# Patient Record
Sex: MALE | Race: WHITE | Employment: FULL TIME | URBAN - NONMETROPOLITAN AREA
[De-identification: names, ages, dates, MRNs, and addresses within clinical notes are randomized per-mention and may not be internally consistent; named-entity substitution may affect disease eponyms.]

---

## 2017-02-01 ENCOUNTER — OFFICE VISIT (OUTPATIENT)
Age: 62
End: 2017-02-01

## 2017-02-01 VITALS
RESPIRATION RATE: 16 BRPM | SYSTOLIC BLOOD PRESSURE: 122 MMHG | TEMPERATURE: 97.8 F | HEIGHT: 71 IN | WEIGHT: 206.6 LBS | HEART RATE: 72 BPM | BODY MASS INDEX: 28.92 KG/M2 | DIASTOLIC BLOOD PRESSURE: 70 MMHG

## 2017-02-01 DIAGNOSIS — T81.89XA ERODED SUTURE, INITIAL ENCOUNTER: Primary | ICD-10-CM

## 2017-02-01 PROCEDURE — 99213 OFFICE O/P EST LOW 20 MIN: CPT | Performed by: SURGERY

## 2017-02-01 ASSESSMENT — ENCOUNTER SYMPTOMS
VOICE CHANGE: 0
VOMITING: 0
DIARRHEA: 0
SHORTNESS OF BREATH: 0
CHOKING: 0
TROUBLE SWALLOWING: 0
FACIAL SWELLING: 0
ABDOMINAL DISTENTION: 0
EYE ITCHING: 0
ANAL BLEEDING: 0
EYE REDNESS: 0
RHINORRHEA: 0
CONSTIPATION: 0
COUGH: 0
BLOOD IN STOOL: 0
SINUS PRESSURE: 0
SORE THROAT: 0
COLOR CHANGE: 0
CHEST TIGHTNESS: 0
RECTAL PAIN: 0
EYE DISCHARGE: 0
ABDOMINAL PAIN: 1
EYE PAIN: 0
NAUSEA: 0
PHOTOPHOBIA: 0
WHEEZING: 0
APNEA: 0
BACK PAIN: 0
STRIDOR: 0

## 2020-02-03 ENCOUNTER — TELEPHONE (OUTPATIENT)
Dept: FAMILY MEDICINE CLINIC | Age: 65
End: 2020-02-03

## 2020-02-12 ENCOUNTER — OFFICE VISIT (OUTPATIENT)
Dept: FAMILY MEDICINE CLINIC | Age: 65
End: 2020-02-12
Payer: COMMERCIAL

## 2020-02-12 ENCOUNTER — NURSE ONLY (OUTPATIENT)
Dept: LAB | Age: 65
End: 2020-02-12

## 2020-02-12 VITALS
WEIGHT: 216 LBS | SYSTOLIC BLOOD PRESSURE: 124 MMHG | HEIGHT: 70 IN | TEMPERATURE: 98.7 F | DIASTOLIC BLOOD PRESSURE: 70 MMHG | HEART RATE: 78 BPM | BODY MASS INDEX: 30.92 KG/M2

## 2020-02-12 LAB
ALBUMIN SERPL-MCNC: 4.4 G/DL (ref 3.5–5.1)
ALP BLD-CCNC: 68 U/L (ref 38–126)
ALT SERPL-CCNC: 61 U/L (ref 11–66)
ANION GAP SERPL CALCULATED.3IONS-SCNC: 12 MEQ/L (ref 8–16)
AST SERPL-CCNC: 25 U/L (ref 5–40)
BASOPHILS # BLD: 0.7 %
BASOPHILS ABSOLUTE: 0 THOU/MM3 (ref 0–0.1)
BILIRUB SERPL-MCNC: 0.4 MG/DL (ref 0.3–1.2)
BUN BLDV-MCNC: 13 MG/DL (ref 7–22)
CALCIUM SERPL-MCNC: 9.6 MG/DL (ref 8.5–10.5)
CHLORIDE BLD-SCNC: 102 MEQ/L (ref 98–111)
CHOLESTEROL, TOTAL: 253 MG/DL (ref 100–199)
CO2: 25 MEQ/L (ref 23–33)
CREAT SERPL-MCNC: 0.9 MG/DL (ref 0.4–1.2)
EOSINOPHIL # BLD: 4.5 %
EOSINOPHILS ABSOLUTE: 0.3 THOU/MM3 (ref 0–0.4)
ERYTHROCYTE [DISTWIDTH] IN BLOOD BY AUTOMATED COUNT: 12.9 % (ref 11.5–14.5)
ERYTHROCYTE [DISTWIDTH] IN BLOOD BY AUTOMATED COUNT: 45.1 FL (ref 35–45)
GFR SERPL CREATININE-BSD FRML MDRD: 85 ML/MIN/1.73M2
GLUCOSE BLD-MCNC: 92 MG/DL (ref 70–108)
HCT VFR BLD CALC: 50.6 % (ref 42–52)
HDLC SERPL-MCNC: 38 MG/DL
HEMOGLOBIN: 16.7 GM/DL (ref 14–18)
IMMATURE GRANS (ABS): 0.05 THOU/MM3 (ref 0–0.07)
IMMATURE GRANULOCYTES: 0.7 %
LDL CHOLESTEROL CALCULATED: 184 MG/DL
LYMPHOCYTES # BLD: 28.5 %
LYMPHOCYTES ABSOLUTE: 2 THOU/MM3 (ref 1–4.8)
MCH RBC QN AUTO: 31.5 PG (ref 26–33)
MCHC RBC AUTO-ENTMCNC: 33 GM/DL (ref 32.2–35.5)
MCV RBC AUTO: 95.3 FL (ref 80–94)
MONOCYTES # BLD: 8.2 %
MONOCYTES ABSOLUTE: 0.6 THOU/MM3 (ref 0.4–1.3)
NUCLEATED RED BLOOD CELLS: 0 /100 WBC
PLATELET # BLD: 288 THOU/MM3 (ref 130–400)
PMV BLD AUTO: 10 FL (ref 9.4–12.4)
POTASSIUM SERPL-SCNC: 4.6 MEQ/L (ref 3.5–5.2)
PRO-BNP: 12.6 PG/ML (ref 0–900)
RBC # BLD: 5.31 MILL/MM3 (ref 4.7–6.1)
SEG NEUTROPHILS: 57.4 %
SEGMENTED NEUTROPHILS ABSOLUTE COUNT: 4 THOU/MM3 (ref 1.8–7.7)
SODIUM BLD-SCNC: 139 MEQ/L (ref 135–145)
TOTAL PROTEIN: 7.1 G/DL (ref 6.1–8)
TRIGL SERPL-MCNC: 153 MG/DL (ref 0–199)
TSH SERPL DL<=0.05 MIU/L-ACNC: 1.9 UIU/ML (ref 0.4–4.2)
WBC # BLD: 6.9 THOU/MM3 (ref 4.8–10.8)

## 2020-02-12 PROCEDURE — 99204 OFFICE O/P NEW MOD 45 MIN: CPT | Performed by: FAMILY MEDICINE

## 2020-02-12 PROCEDURE — 93000 ELECTROCARDIOGRAM COMPLETE: CPT | Performed by: FAMILY MEDICINE

## 2020-02-12 RX ORDER — PREDNISONE 20 MG/1
40 TABLET ORAL DAILY
Qty: 14 TABLET | Refills: 0 | Status: SHIPPED | OUTPATIENT
Start: 2020-02-12 | End: 2020-02-19

## 2020-02-12 RX ORDER — DICLOFENAC SODIUM 75 MG/1
75 TABLET, DELAYED RELEASE ORAL 2 TIMES DAILY
COMMUNITY
End: 2020-09-15 | Stop reason: ALTCHOICE

## 2020-02-12 RX ORDER — SILDENAFIL 50 MG/1
50 TABLET, FILM COATED ORAL PRN
Qty: 5 TABLET | Refills: 3 | Status: SHIPPED | OUTPATIENT
Start: 2020-02-12 | End: 2020-09-15 | Stop reason: ALTCHOICE

## 2020-02-12 RX ORDER — MINOCYCLINE HYDROCHLORIDE 100 MG/1
100 CAPSULE ORAL 2 TIMES DAILY
COMMUNITY
End: 2021-10-11

## 2020-02-12 ASSESSMENT — ENCOUNTER SYMPTOMS
BLOOD IN STOOL: 0
VOMITING: 0
EYE DISCHARGE: 0
DIARRHEA: 0
CONSTIPATION: 0
EYE REDNESS: 0
SHORTNESS OF BREATH: 0
SORE THROAT: 0
BACK PAIN: 0
NAUSEA: 0
COUGH: 0
WHEEZING: 0
EYE PAIN: 0

## 2020-02-12 NOTE — PROGRESS NOTES
erection  He has not spontaneous or nocturnal erections since symptoms started    He does have problems with sexual desire  He does have problems with low energy level  He does have symptoms of depression or anxiety, has noted that he is more emotional recently  He does not smoke   He does not drink alcohol regularly  He does not report difficulties with relationship with his partner(s)    He does not take oral nitrates for chest pain. No results found for: TESTOSTERONE    No results found for: PSA    EKG - Rhythm - sinus, Rate - 62, intervals wnl, no acute ST segment changes      I have reviewed the patient's past medical history, past surgical history, allergies,medications, social and family history and I have made updates where appropriate. Past Medical History:   Diagnosis Date    GERD (gastroesophageal reflux disease)        Past Surgical History:   Procedure Laterality Date    APPENDECTOMY  1983??    COLONOSCOPY  3-2016    CYSTOSCOPY Left 7/12/15    Left Ureteroscopy, Pyelogram, Left Ureteral Dilation, Stent Insertion - Dr. Murrieta [facility]  10/04/2016    Robotic Right Inguinal Hernia Repair, Umbilical Hernia Repair - Dr. Niko Murilloer    LITHOTRIPSY Left 3-18-16    LT ESWL    TONSILLECTOMY      as a child       Social History     Tobacco Use    Smoking status: Never Smoker    Smokeless tobacco: Never Used   Substance Use Topics    Alcohol use: Yes     Comment: social    Drug use: No       No family history on file. Review of Systems   Constitutional: Negative for chills and fever. HENT: Negative for congestion, ear pain, nosebleeds, sore throat and tinnitus. Eyes: Negative for pain, discharge and redness. Respiratory: Negative for cough, shortness of breath and wheezing. Cardiovascular: Positive for leg swelling. Negative for chest pain and palpitations. Gastrointestinal: Negative for blood in stool, constipation, diarrhea, nausea and vomiting.    Endocrine: Negative for polydipsia. Genitourinary: Negative for dysuria, frequency, hematuria and urgency. Musculoskeletal: Negative for back pain and myalgias. Skin: Negative for rash. Allergic/Immunologic: Negative for environmental allergies. Neurological: Positive for dizziness and light-headedness. Negative for tremors, seizures, weakness and headaches. Hematological: Does not bruise/bleed easily. Psychiatric/Behavioral: Negative for hallucinations and suicidal ideas. The patient is not nervous/anxious.             PHYSICAL EXAM:  /70   Pulse 78   Temp 98.7 °F (37.1 °C) (Oral)   Ht 5' 10\" (1.778 m)   Wt 216 lb (98 kg)   BMI 30.99 kg/m²     General Appearance: well developed and well- nourished, in no acute distress  Head: normocephalic and atraumatic  Eyes: pupils equal, round, and reactive to light,  conjunctivae and eye lids without erythema  ENT: external ear and ear canal normal bilaterally, TMs regular, nose without deformity, nasal mucosa and turbinates congested without polyps, oropharynx normal, dentition is normal for age, no lip or gum lesions noted  Neck: supple and non-tender without mass, no thyromegaly or thyroid nodules, no cervical lymphadenopathy  Pulmonary/Chest: clear to auscultation bilaterally- no wheezes, rales or rhonchi, normal air movement, no respiratory distress orretractions  Cardiovascular: normal rate, regular rhythm, normal S1 and S2, no murmurs, rubs, clicks, or gallops,distal pulses intact  Abdomen: soft, non-tender, non-distended, normal bowel sounds,  no rebound or guarding, nomasses or hernias noted, no hepatospleenomegaly  Extremities: no cyanosis, clubbing of the lower extremities, mild pedal edema noted bilaterally  Musculoskeletal: No joint swelling or gross deformity   Neuro:  Alert, 5/5 strength globally and symmetrically, normal speech, no focal findings or movement disorder noted, gait wnl  Psych:  Normal affect without evidence of depression or anxiety, insight and time. I have counseled him that taking Viagra with nitrates of any form can cause death. Return in about 4 weeks (around 3/11/2020), or if symptoms worsen or fail to improve. Hubert received counseling on the following healthy behaviors: medication adherence  Reviewedprior labs and health maintenance. Continue current medications, diet and exercise. Discussed use, benefit, and side effects of prescribed medications. Barriers to medication compliance addressed. Patient given educationalmaterials - see patient instructions. All patient questions answered. Patient voiced understanding.

## 2020-02-15 LAB — TESTOSTERONE TOTAL: 316 NG/DL (ref 300–720)

## 2020-03-12 ENCOUNTER — OFFICE VISIT (OUTPATIENT)
Dept: FAMILY MEDICINE CLINIC | Age: 65
End: 2020-03-12
Payer: COMMERCIAL

## 2020-03-12 ENCOUNTER — NURSE ONLY (OUTPATIENT)
Dept: LAB | Age: 65
End: 2020-03-12

## 2020-03-12 VITALS
DIASTOLIC BLOOD PRESSURE: 77 MMHG | HEIGHT: 71 IN | HEART RATE: 78 BPM | SYSTOLIC BLOOD PRESSURE: 117 MMHG | BODY MASS INDEX: 30.24 KG/M2 | WEIGHT: 216 LBS | RESPIRATION RATE: 14 BRPM | TEMPERATURE: 97.8 F

## 2020-03-12 LAB
CHOLESTEROL, TOTAL: 269 MG/DL (ref 100–199)
HDLC SERPL-MCNC: 34 MG/DL
LDL CHOLESTEROL CALCULATED: 198 MG/DL
TRIGL SERPL-MCNC: 187 MG/DL (ref 0–199)

## 2020-03-12 PROCEDURE — 99214 OFFICE O/P EST MOD 30 MIN: CPT | Performed by: FAMILY MEDICINE

## 2020-03-12 ASSESSMENT — PATIENT HEALTH QUESTIONNAIRE - PHQ9
SUM OF ALL RESPONSES TO PHQ QUESTIONS 1-9: 0
1. LITTLE INTEREST OR PLEASURE IN DOING THINGS: 0
2. FEELING DOWN, DEPRESSED OR HOPELESS: 0
SUM OF ALL RESPONSES TO PHQ9 QUESTIONS 1 & 2: 0
SUM OF ALL RESPONSES TO PHQ QUESTIONS 1-9: 0

## 2020-03-12 NOTE — PROGRESS NOTES
Vlad Melendez is a 60 y.o. male that presents for     Chief Complaint   Patient presents with    6 Month Follow-Up    Insomnia     trouble staying asleep (years) melatonin doesnt work well       /77   Pulse 78   Temp 97.8 °F (36.6 °C) (Oral)   Resp 14   Ht 5' 11\" (1.803 m)   Wt 216 lb (98 kg)   BMI 30.13 kg/m²       HPI:    Dizziness has improved. Has not had the symptoms for the past week. Has completed the prednisone at this point. Still feel like the left ear is 'blocked'. LE edema has been improved. Has been wearing the compression stockings more frequently. Insomnia:  Notes that he has not slept well for years. Goes to bed 10PM, 1/2 hour to fall asleep. Wakes up at 3:30 and has difficulty falling back asleep. Sleeps in a bed in a dark room. Drinks 1 cup coffee in the AM each day. No EtOH. Has tried melatonin without improvement. Dyslipidemia    Current Medication regimen - none  Personal History of CAD? No   Hx of CAD in first degree relatives? No  Current smoker? No  History of HTN? No  History of DM? No  10 year cardiac risk 12.2%    Shortness of breath or chest pain? No  Neurologic changes? No  Extremity edema?  No    Lab Results   Component Value Date    LDLCALC 184 02/12/2020     BP Readings from Last 3 Encounters:   03/12/20 117/77   02/12/20 124/70   06/24/17 126/86     Wt Readings from Last 3 Encounters:   03/12/20 216 lb (98 kg)   02/12/20 216 lb (98 kg)   06/24/17 198 lb (89.8 kg)         Health Maintenance   Topic Date Due    Hepatitis C screen  1955    HIV screen  04/30/1972    DTaP/Tdap/Td vaccine (1 - Tdap) 04/30/1976    Shingles Vaccine (1 of 2) 04/30/2007    Flu vaccine (1) 09/01/2019    Lipid screen  02/12/2025    Colon cancer screen colonoscopy  03/24/2026    Hepatitis A vaccine  Aged Out    Hepatitis B vaccine  Aged Out    Hib vaccine  Aged Out    Meningococcal (ACWY) vaccine  Aged Out    Pneumococcal 0-64 years Vaccine  Aged [facility] Past Medical History:   Diagnosis Date    GERD (gastroesophageal reflux disease)        Past Surgical History:   Procedure Laterality Date    APPENDECTOMY  1983??    COLONOSCOPY  3-2016    CYSTOSCOPY Left 7/12/15    Left Ureteroscopy, Pyelogram, Left Ureteral Dilation, Stent Insertion - Dr. Roldan Ngo  10/04/2016    Robotic Right Inguinal Hernia Repair, Umbilical Hernia Repair - Dr. Amy Shankar    LITHOTRIPSY Left 3-18-16    LT ESWL    TONSILLECTOMY      as a child       Social History     Tobacco Use    Smoking status: Never Smoker    Smokeless tobacco: Never Used   Substance Use Topics    Alcohol use: Yes     Comment: social    Drug use: No       No family history on file. I have reviewed the patient's past medical history, past surgical history, allergies, medications, social and family history and I have made updates where appropriate.     Review of Systems        PHYSICAL EXAM:  /77   Pulse 78   Temp 97.8 °F (36.6 °C) (Oral)   Resp 14   Ht 5' 11\" (1.803 m)   Wt 216 lb (98 kg)   BMI 30.13 kg/m²     General Appearance: well developed and well- nourished, in no acute distress  Head: normocephalic and atraumatic  ENT: external ear and ear canal normal bilaterally, nose without deformity, nasal mucosa and turbinates normal without polyps, oropharynx normal, dentition is normal for age, no lipor gum lesions noted  Neck: supple and non-tender without mass, no thyromegaly or thyroid nodules, no cervical lymphadenopathy  Pulmonary/Chest: clear to auscultation bilaterally- no wheezes, rales or rhonchi, normal air movement, no respiratory distress or retractions  Cardiovascular: normal rate, regular rhythm, normal S1 and S2, no murmurs, rubs, clicks, or gallops, distal pulses intact  Extremities: no cyanosis, clubbing or edema of the lower extremities  Psych:  Normal affect without evidence of depression oranxiety, insight and judgement are appropriate, memory appears intact  Skin: warm and dry, no rash or erythema      ASSESSMENT & PLAN  Carlyn Hatchet was seen today for 6 month follow-up and insomnia. Diagnoses and all orders for this visit:    Dyslipidemia  -     Lipid Panel; Future    Episodic lightheadedness    ETD (Eustachian tube dysfunction), bilateral    Insomnia, unspecified type    -Cardiac risk elevated, no personal or FamHx of CAD, repeat lipids in 6 months, if risk still elevated, will start treatment  -Declines treatment for insomnia at this time  -Other issues are doing better, will hold on further treatment or evaluation unless worsening. Return in about 6 months (around 9/12/2020), or if symptoms worsen or fail to improve. Controlled Substance Monitoring:    Acute and Chronic Pain Monitoring:   No flowsheet data found. Carlyn Hatchet received counseling on the following healthy behaviors: medication adherence  Reviewed prior labs and health maintenance. Continue current medications, diet and exercise. Discussed use, benefit, and side effects of prescribed medications. Barriers to medication compliance addressed. Patient given educational materials - see patient instructions. All patient questions answered. Patient voiced understanding.

## 2020-03-13 ENCOUNTER — TELEPHONE (OUTPATIENT)
Dept: FAMILY MEDICINE CLINIC | Age: 65
End: 2020-03-13

## 2020-03-13 RX ORDER — ATORVASTATIN CALCIUM 20 MG/1
20 TABLET, FILM COATED ORAL DAILY
Qty: 30 TABLET | Refills: 5 | Status: SHIPPED | OUTPATIENT
Start: 2020-03-13 | End: 2021-02-25

## 2020-09-15 ENCOUNTER — OFFICE VISIT (OUTPATIENT)
Dept: FAMILY MEDICINE CLINIC | Age: 65
End: 2020-09-15
Payer: COMMERCIAL

## 2020-09-15 VITALS
WEIGHT: 221.6 LBS | OXYGEN SATURATION: 99 % | TEMPERATURE: 98.2 F | BODY MASS INDEX: 31.73 KG/M2 | RESPIRATION RATE: 14 BRPM | SYSTOLIC BLOOD PRESSURE: 112 MMHG | DIASTOLIC BLOOD PRESSURE: 60 MMHG | HEIGHT: 70 IN | HEART RATE: 69 BPM

## 2020-09-15 PROCEDURE — 99214 OFFICE O/P EST MOD 30 MIN: CPT | Performed by: FAMILY MEDICINE

## 2020-09-15 RX ORDER — PREDNISONE 20 MG/1
40 TABLET ORAL DAILY
Qty: 14 TABLET | Refills: 0 | Status: SHIPPED | OUTPATIENT
Start: 2020-09-15 | End: 2020-09-22

## 2020-09-15 RX ORDER — OMEPRAZOLE 20 MG/1
20 CAPSULE, DELAYED RELEASE ORAL DAILY
COMMUNITY

## 2020-09-15 NOTE — PATIENT INSTRUCTIONS
Patient Education        Tennis Elbow: Care Instructions  Your Care Instructions     Tennis elbow is soreness or pain on the outer part of the elbow. The pain occurs when the tendon is stretched and becomes irritated by repeated twisting of the hand, wrist, and forearm. A tendon is a tough tissue that connects muscle to bone. This injury is common in tennis players. But you also can get it from many activities that work the same muscles. Examples include gardening, painting, and using tools. Tennis elbow usually heals with rest and treatment at home. Follow-up care is a key part of your treatment and safety. Be sure to make and go to all appointments, and call your doctor if you are having problems. It's also a good idea to know your test results and keep a list of the medicines you take. How can you care for yourself at home? · Rest your fingers, wrist, and forearm. Try to stop or reduce any activity that causes elbow pain. You may have to rest your arm for weeks to months. Follow your doctor's directions for how long to rest.  · Put ice or a cold pack on your elbow for 10 to 20 minutes at a time. Try to do this every 1 to 2 hours for the next 3 days (when you are awake) or until the swelling goes down. Put a thin cloth between the ice and your skin. You can try heat, or alternating heat and ice, after the first 3 days. · If your doctor gave you a brace or splint, use it as directed. A \"counterforce\" brace is a strap around your forearm, just below your elbow. It may ease the pressure on the tendon and spread force throughout your arm. · Prop up your elbow on pillows to help reduce swelling. · Follow your doctor's or physical therapist's directions for exercise. · Return to your usual activities slowly. · Try to prevent the problem. Learn the best techniques for your sport. For example, make sure the  on your tennis racquet is not too big for your hand.  Try not to hit a tennis ball late in your swing.  · Think about asking your employer about new ways of doing your job if your elbow pain is caused by something you do at work. Medicines  · Be safe with medicines. Read and follow all instructions on the label. ? If the doctor gave you a prescription medicine for pain, take it as prescribed. ? If you are not taking a prescription pain medicine, ask your doctor if you can take an over-the-counter medicine. When should you call for help? Call your doctor now or seek immediate medical care if:  · Your pain is worse. · You cannot bend your elbow normally. · Your arm or hand is cool or pale or changes color. · You have tingling, weakness, or numbness in your hand and fingers. Watch closely for changes in your health, and be sure to contact your doctor if:  · You have work problems caused by your elbow pain. · Your pain is not better after 2 weeks. Where can you learn more? Go to https://Venturocket.Folloyu. org and sign in to your [employer] account. Enter 0699 [id number] [phone] in the [employer]star box to learn more about \"Tennis Elbow: Care Instructions. \"     If you do not have an account, please click on the \"Sign Up Now\" link. Current as of: March 2, 2020               Content Version: 12.5  © 2006-2020 Healthwise, Incorporated. Care instructions adapted under license by [facility] ([facility]). If you have questions about a medical condition or this instruction, always ask your healthcare professional. [place] [address] any warranty or liability for your use of this information. Patient Education        Tennis Elbow: Exercises  Introduction  Here are some examples of exercises for you to try. The exercises may be suggested for a condition or for rehabilitation. Start each exercise slowly. Ease off the exercises if you start to have pain. You will be told when to start these exercises and which ones will work best for you. How to do the exercises  Wrist flexor stretch   1.  Extend your arm in front of you with your palm up. 2. Bend your wrist, pointing your hand toward the floor. 3. With your other hand, gently bend your wrist farther until you feel a mild to moderate stretch in your forearm. 4. Hold for at least 15 to 30 seconds. Repeat 2 to 4 times. Wrist extensor stretch   1. Repeat steps 1 to 4 of the stretch above but begin with your extended hand palm down. Ball or sock squeeze   1. Hold a tennis ball (or a rolled-up sock) in your hand. 2. Make a fist around the ball (or sock) and squeeze. 3. Hold for about 6 seconds, and then relax for up to 10 seconds. 4. Repeat 8 to 12 times. 5. Switch the ball (or sock) to your other hand and do 8 to 12 times. Wrist deviation   1. Sit so that your arm is supported but your hand hangs off the edge of a flat surface, such as a table. 2. Hold your hand out like you are shaking hands with someone. 3. Move your hand up and down. 4. Repeat this motion 8 to 12 times. 5. Switch arms. 6. Try to do this exercise twice with each hand. Wrist curls   1. Place your forearm on a table with your hand hanging over the edge of the table, palm up. 2. Place a 1- to 2-pound weight in your hand. This may be a dumbbell, a can of food, or a filled water bottle. 3. Slowly raise and lower the weight while keeping your forearm on the table and your palm facing up. 4. Repeat this motion 8 to 12 times. 5. Switch arms, and do steps 1 through 4.  6. Repeat with your hand facing down toward the floor. Switch arms. Biceps curls   1. Sit leaning forward with your legs slightly spread and your left hand on your left thigh. 2. Place your right elbow on your right thigh, and hold the weight with your forearm horizontal.  3. Slowly curl the weight up and toward your chest.  4. Repeat this motion 8 to 12 times. 5. Switch arms, and do steps 1 through 4. Follow-up care is a key part of your treatment and safety.  Be sure to make and go to all appointments, and call your doctor if you are having problems. It's also a good idea to know your test results and keep a list of the medicines you take. Where can you learn more? Go to https://FlockTAGpeiraThe African Store.Blued. org and sign in to your [employer] account. Enter N518 in the Pwinty box to learn more about \"Tennis Elbow: Exercises. \"     If you do not have an account, please click on the \"Sign Up Now\" link. Current as of: March 2, 2020               Content Version: 12.5  © 6417-0374 Healthwise, Incorporated. Care instructions adapted under license by [facility] ([facility]). If you have questions about a medical condition or this instruction, always ask your healthcare professional. [address] any warranty or liability for your use of this information.

## 2020-09-15 NOTE — PROGRESS NOTES
Baldemar Gupta is a 63 y.o. male that presents for     Chief Complaint   Patient presents with    6 Month Follow-Up    Elbow Pain     bilateral elbow pain past 2 weeks,   worse when lifting something        /60   Pulse 69   Temp 98.2 °F (36.8 °C) (Oral)   Resp 14   Ht 5' 10\" (1.778 m)   Wt 221 lb 9.6 oz (100.5 kg)   SpO2 99%   BMI 31.80 kg/m²       HPI:    Notes that he is having left elbow pain for the past 2 months. No inciting injury. Worse with extension and gripping. Insomnia:  Continues to have difficulty falling and staying asleep. Has been told that he does not feel rested through the day. Has been told that he is gasping. Dyslipidemia  Has not been able to obtain repeat lipids due to pandemic  Current Medication regimen - none currently  Tolerating medications well? Yes  Personal History of CAD? No   Hx of CAD in first degree relatives? No  Current smoker? No  History of HTN? No  History of DM? No    Shortness of breath or chest pain? No  Neurologic changes? No  Extremity edema?  No    Lab Results   Component Value Date    LDLCALC 198 03/12/2020     BP Readings from Last 3 Encounters:   09/15/20 112/60   03/12/20 117/77   02/12/20 124/70     Wt Readings from Last 3 Encounters:   09/15/20 221 lb 9.6 oz (100.5 kg)   03/12/20 216 lb (98 kg)   02/12/20 216 lb (98 kg)         Health Maintenance   Topic Date Due    Hepatitis C screen  1955    HIV screen  04/30/1972    DTaP/Tdap/Td vaccine (1 - Tdap) 04/30/1976    Shingles Vaccine (1 of 2) 04/30/2007    Flu vaccine (1) 09/01/2020    Lipid screen  03/12/2021    Colon cancer screen colonoscopy  03/24/2026    Hepatitis A vaccine  Aged Out    Hepatitis B vaccine  Aged Out    Hib vaccine  Aged Out    Meningococcal (ACWY) vaccine  Aged Out    Pneumococcal 0-64 years Vaccine  Aged Out       Past Medical History:   Diagnosis Date    GERD (gastroesophageal reflux disease)        Past Surgical History:   Procedure Laterality Date    APPENDECTOMY  1983??    COLONOSCOPY  3-2016    CYSTOSCOPY Left 7/12/15    Left Ureteroscopy, Pyelogram, Left Ureteral Dilation, Stent Insertion - Dr. Jerod Gillis  10/04/2016    Robotic Right Inguinal Hernia Repair, Umbilical Hernia Repair - Dr. Luke Hummel    LITHOTRIPSY Left 3-18-16    LT ESWL    TONSILLECTOMY      as a child       Social History     Tobacco Use    Smoking status: Never Smoker    Smokeless tobacco: Never Used   Substance Use Topics    Alcohol use: Yes     Comment: social    Drug use: No       History reviewed. No pertinent family history. I have reviewed the patient's past medical history, past surgical history, allergies, medications, social and family history and I have made updates where appropriate.     Review of Systems        PHYSICAL EXAM:  /60   Pulse 69   Temp 98.2 °F (36.8 °C) (Oral)   Resp 14   Ht 5' 10\" (1.778 m)   Wt 221 lb 9.6 oz (100.5 kg)   SpO2 99%   BMI 31.80 kg/m²     General Appearance: well developed and well- nourished, in no acute distress  Head: normocephalic and atraumatic  ENT: external ear and ear canal normal bilaterally, nose without deformity, nasal mucosa and turbinates normal without polyps, oropharynx normal, dentition is normal for age, no lipor gum lesions noted  Neck: supple and non-tender without mass, no thyromegaly or thyroid nodules, no cervical lymphadenopathy  Pulmonary/Chest: clear to auscultation bilaterally- no wheezes, rales or rhonchi, normal air movement, no respiratory distress or retractions  Cardiovascular: normal rate, regular rhythm, normal S1 and S2, no murmurs, rubs, clicks, or gallops, distal pulses intact  Abdomen: soft, non-tender, non-distended, no rebound or guarding, no masses or hernias noted, no hepatospleenomegaly  Extremities: no cyanosis, clubbing or edema of the lower extremities  Psych:  Normal affect without evidence of depression oranxiety, insight and judgement are

## 2020-09-17 ENCOUNTER — NURSE ONLY (OUTPATIENT)
Dept: LAB | Age: 65
End: 2020-09-17

## 2020-09-17 LAB
CHOLESTEROL, TOTAL: 216 MG/DL (ref 100–199)
HDLC SERPL-MCNC: 42 MG/DL
LDL CHOLESTEROL CALCULATED: 159 MG/DL
TRIGL SERPL-MCNC: 77 MG/DL (ref 0–199)

## 2020-11-03 ENCOUNTER — INITIAL CONSULT (OUTPATIENT)
Dept: PULMONOLOGY | Age: 65
End: 2020-11-03
Payer: COMMERCIAL

## 2020-11-03 VITALS
DIASTOLIC BLOOD PRESSURE: 70 MMHG | OXYGEN SATURATION: 99 % | HEIGHT: 70 IN | BODY MASS INDEX: 31.75 KG/M2 | SYSTOLIC BLOOD PRESSURE: 122 MMHG | WEIGHT: 221.8 LBS | HEART RATE: 68 BPM

## 2020-11-03 PROCEDURE — 99203 OFFICE O/P NEW LOW 30 MIN: CPT | Performed by: INTERNAL MEDICINE

## 2020-11-03 NOTE — PATIENT INSTRUCTIONS
Patient Education        Learning About CPAP for Sleep Apnea  What is CPAP? CPAP is a small machine that you use at home every night while you sleep. It increases air pressure in your throat to keep your airway open. When you have sleep apnea, this can help you sleep better so you feel much better. CPAP stands for \"continuous positive airway pressure. \"  The CPAP machine will have one of the following:  · A mask that covers your nose and mouth  · Prongs that fit into your nose  · A mask that covers your nose only, which is the most common type. This type is called NCPAP. The N stands for \"nasal.\"  Why is it done? CPAP is usually the best treatment for obstructive sleep apnea. It is the first treatment choice and the most widely used. CPAP:  · Helps you have more normal sleep, so you feel less sleepy and more alert during the daytime. · May help keep heart failure or other heart problems from getting worse. · May help lower your blood pressure. If you use CPAP, your bed partner may also sleep better. That's because you aren't snoring or restless. Your doctor may suggest CPAP if you have:  · Moderate to severe sleep apnea. · Sleep apnea and coronary artery disease (CAD). · Sleep apnea and heart failure. What are the side effects? Some people who use CPAP have:  · A dry or stuffy nose and a sore throat. · Irritated skin on the face. · Sore eyes. · Bloating. How can you care for yourself? If using CPAP is not comfortable, or if you have certain side effects, work with your doctor to fix them. Here are some things you can try:  · Be sure the mask or nasal prongs fit well. · See if your doctor can adjust the pressure of your CPAP. · If your nose is dry, try a humidifier. · If your nose is runny or stuffy, try decongestant medicine or a steroid nasal spray. Be safe with medicines. Read and follow all instructions on the label. Do not use the medicine longer than the label says.   If these things don't help, you might try a different type of machine. Some machines have air pressure that adjusts on its own. Others have air pressures that are different when you breathe in than when you breathe out. This may reduce discomfort caused by too much pressure in your nose. Where can you learn more? Go to https://chpepiceweb.[employer]. org and sign in to your [email or web address] account. Enter J503 in the [employer] box to learn more about \"Learning About CPAP for Sleep Apnea. \"     If you do not have an account, please click on the \"Sign Up Now\" link. Current as of: February 24, 2020               Content Version: 12.6  © 2006-2020 [employer]. Care instructions adapted under license by [employer]. If you have questions about a medical condition or this instruction, always ask your healthcare professional. [address]  any warranty or liability for your use of this information. Patient Education        Sleep Apnea: Care Instructions  Your Care Instructions     Sleep apnea means that you frequently stop breathing for 10 seconds or longer during sleep. It can be mild to severe, based on the number of times an hour that you stop breathing or have slowed breathing. Blocked or narrowed airways in your nose, mouth, or throat can cause sleep apnea. Your airway can become blocked when your throat muscles and tongue relax during sleep. You can treat sleep apnea at home by making lifestyle changes. You also can use a CPAP breathing machine that keeps tissues in the throat from blocking your airway. Or your doctor may suggest that you use a breathing device while you sleep. It helps keep your airway open. This could be a device that you put in your mouth. In some cases, surgery may be needed to remove enlarged tissues in the throat. Follow-up care is a key part of your treatment and safety.  Be sure to make and go to all appointments, and call your doctor if you are having problems. It's also a good idea to know your test results and keep a list of the medicines you take. How can you care for yourself at home? · Lose weight, if needed. It may reduce the number of times you stop breathing or have slowed breathing. · Sleep on your side. It may stop mild apnea. If you tend to roll onto your back, sew a pocket in the back of your pajama top. Put a tennis ball into the pocket, and stitch the pocket shut. This will help keep you from sleeping on your back. · Avoid alcohol and medicines such as sleeping pills and sedatives before bed. · Do not smoke. Smoking can make sleep apnea worse. If you need help quitting, talk to your doctor about stop-smoking programs and medicines. These can increase your chances of quitting for good. · Prop up the head of your bed 4 to 6 inches by putting bricks under the legs of the bed. · Treat breathing problems, such as a stuffy nose, caused by a cold or allergies. · Try a continuous positive airway pressure (CPAP) breathing machine if your doctor recommends it. The machine keeps your airway open when you sleep. · If CPAP does not work for you, ask your doctor if you can try other breathing machines. A bilevel positive airway pressure machine uses one type of air pressure for breathing in and another type for breathing out. Another device raises or lowers air pressure as needed while you breathe. · Talk to your doctor if:  ? Your nose feels dry or bleeds when you use one of these machines. You may need to increase moisture in the air. A humidifier may help. ? Your nose is runny or stuffy from using a breathing machine. Decongestants or a corticosteroid nasal spray may help. ? You are sleepy during the day and it gets in the way of the normal things you do. Do not drive when you are drowsy. When should you call for help?   Watch closely for changes in your health, and be sure to contact your doctor if:    · You still have sleep apnea even though you have made lifestyle changes.     · You are thinking of trying a device such as CPAP.     · You are having problems using a CPAP or similar machine. Where can you learn more? Go to https://[employer]ago. org and sign in to your [employer] account. Enter K204 in the [facility] box to learn more about \"Sleep Apnea: Care Instructions. \"     If you do not have an account, please click on the \"Sign Up Now\" link. Current as of: February 24, 2020               Content Version: 12.6  © 4917-3820 [employer]. Care instructions adapted under license by [facility] ([facility]). If you have questions about a medical condition or this instruction, always ask your healthcare professional. [address] any warranty or liability for your use of this information.

## 2020-11-03 NOTE — PROGRESS NOTES
New Sleep Patient H/P    Presentation:  Juan Luis Thorne is referred by BRENDA    Juan Luis Thorne has textbook s/s of BRENDA with loud snoring, non restorative sleep, daytime somnolence with periods of sleep at inappropriate times, witnessed episodes of sleep apnea by ex-wife, falling sleep driving. Has gained 30 lbs over the last few years, symptomatic for several years. Decreased production, lack of concentration, falls sleep at the computer at work. Time in Bed:   Bedtime: 11p.m. Awakens  7 a.m. Different on weekends? No       Hubert falls asleep in 5  minutes. Any awakenings? Yes  Difficulty Falling back to sleep? No  {Symptoms began:  several years ago. Symptoms include: snoring, choking, periods of not breathing, excessive daytime sleepiness, falling asleep while at work, driving, watching television, disrupted sleep, naps    Previous evaluation and treatment? No      He denies any history of sleep walking or sleep talking. No history of seizures activity. No history suggestive of restless legs syndrome. No history of bruxism. No history of head injury. Naps:  Any naps? Yes and are they helpful Yes    Snoring and Apneas:  Do you snore or been told you a snore? Yes  How long have known about your snoring? years  Any witnessed apneas? Yes  Any awakenings with choking or gasping? Yes    Dreams:  Any recurring dreams? No  Hallucinations? No  Sleep Paralysis? No  Symptoms of Cataplexy? No    Driving History:  Do you have a CDL or drive long distances for work? No  Any driving accidents in the past year? No  Any sleepiness while driving? Yes    Weight:  Any change in weight over the past year? Yes   How about past 5 years? Yes  How much? 30    Other Compliants :Juan Luis Thorne complains of decreased concentration as well.     Past Medical History:   Diagnosis Date    GERD (gastroesophageal reflux disease)        Past Surgical History:   Procedure Laterality Date    APPENDECTOMY  1983??    COLONOSCOPY  3-2016    CYSTOSCOPY Left 7/12/15    Left Ureteroscopy, Pyelogram, Left Ureteral Dilation, Stent Insertion - Dr. Jewel Boudreaux  10/04/2016    Robotic Right Inguinal Hernia Repair, Umbilical Hernia Repair - Dr. Merle Zamudio    LITHOTRIPSY Left 3-18-16    LT ESWL    TONSILLECTOMY      as a child       Social History     Tobacco Use    Smoking status: Never Smoker    Smokeless tobacco: Never Used   Substance Use Topics    Alcohol use: Yes     Comment: social    Drug use: No       No Known Allergies    Current Outpatient Medications   Medication Sig Dispense Refill    omeprazole (PRILOSEC) 20 MG delayed release capsule Take 20 mg by mouth daily      atorvastatin (LIPITOR) 20 MG tablet Take 1 tablet by mouth daily 30 tablet 5    minocycline (MINOCIN;DYNACIN) 100 MG capsule Take 100 mg by mouth 2 times daily      NONFORMULARY Urinary Tract Control      calcium carbonate (TUMS) 500 MG chewable tablet Take 1 tablet by mouth daily as needed for Heartburn       No current facility-administered medications for this visit. No family history on file. Any family history of any sleep problems or any one in your family on CPAP? Yes    Social History     Tobacco Use    Smoking status: Never Smoker    Smokeless tobacco: Never Used   Substance Use Topics    Alcohol use: Yes     Comment: social    Drug use: No     Caffeine Intake: How much soda (pop), coffee, tea, power drinks do you ingest per day? 4 per day. Employment History:  Where do you work? [employer]  What are your shifts? 7a to 4p from home        Review of Systems:   General/Constitutional: No recent loss of weight or appetite changes. No fever or chills. HENT: Negative. Eyes: Negative. Upper respiratory tract: No nasal stuffiness or post nasal drip. Lower respiratory tract/ lungs: No cough or sputum production. No hemoptysis. Cardiovascular: No palpitations or chest pain.   Gastrointestinal: No nausea or vomiting. Neurological: No focal neurologiacal weakness. Extremities: No edema. Musculoskeletal: No complaints. Genitourinary: No complaints. Hematological: Negative. Psychiatric/Behavioral: Negative. Skin: No itching. Physical Exam:    HEIGHTHeight: 5' 10\" (177.8 cm) WEIGHTWeight: 221 lb 12.8 oz (100.6 kg)    BMI:  Body mass index is 31.82 kg/m². Neck Size: 18.25 inches      ESS: 9   Vitals: /70 (Site: Left Upper Arm, Position: Sitting, Cuff Size: Large Adult)   Pulse 68   Ht 5' 10\" (1.778 m)   Wt 221 lb 12.8 oz (100.6 kg)   SpO2 99% Comment: on room air at rest  BMI 31.82 kg/m²       Mallampati Score: 3    Physical Exam :  Constitutional: BMI 31.8  HENT:   Head: Normocephalic and atraumatic. Mouth/Throat: Oropharynx is clear and moist. Large tongue, crowded pharynx, mallampati class 3 . Eyes: Conjunctivae are normal. PERRLA. No scleral icterus. Neck: Neck supple. No JVD present. No tracheal deviation present. Cardiovascular: Normal rate, regular rhythm, normal heart sounds. No murmur heard. Pulmonary/Chest: Effort normal and breath sounds normal. No stridor. No respiratory distress. No wheezes. No rales. Abdominal: Soft. No distension. No tenderness. Musculoskeletal: Normal range of motion. Lymphadenopathy:  No cervical adenopathy. Neurological: Alert and oriented to person, place, and time. No focal deficits. Skin: Skin is warm and dry. Patient is not diaphoretic. Psychiatric: Normal behavior with normal mood and affect. Diagnostic Data:    Assessment    Diagnosis Orders   1. Hypersomnia  Baseline Diagnostic Sleep Study   2. Obesity (BMI 30-39. 9)  Baseline Diagnostic Sleep Study   3. Witnessed episode of apnea  Baseline Diagnostic Sleep Study   4. Excessive sleepiness while driving  Baseline Diagnostic Sleep Study   5.  Sleep related choking sensation  Baseline Diagnostic Sleep Study         Plan   Orders Placed This Encounter   Procedures    Baseline Diagnostic Sleep Study     Standing Status:   Future     Standing Expiration Date:   11/3/2021     Order Specific Question:   Adult or Pediatric     Answer:   Adult Study (>7 Years)     Order Specific Question:   Location For Sleep Study     Answer:   [facility]     Order Specific Question:   Select Sleep Lab Location     Answer:   [address]           Mask Desensitization and Pre study teaching? No  Weight Loss Information Given? Yes  Sleep Hygiene Discussed? Yes  -He call my office for earlier appointment if needed for worsening of sleep symptoms.  -Millie Burton educated about my impression and plan. Patient verbalizes understanding.

## 2020-11-19 ENCOUNTER — TELEPHONE (OUTPATIENT)
Dept: SLEEP CENTER | Age: 65
End: 2020-11-19

## 2020-11-19 NOTE — TELEPHONE ENCOUNTER
Charlette Dowd does not meet criteria for in-lab testing. HST does not require authorization. Please advise. Thank you.    Jennifer Quan

## 2020-12-02 ENCOUNTER — HOSPITAL ENCOUNTER (OUTPATIENT)
Dept: SLEEP CENTER | Age: 65
Discharge: HOME | End: 2020-12-04
Payer: COMMERCIAL

## 2020-12-02 PROCEDURE — 95806 SLEEP STUDY UNATT&RESP EFFT: CPT

## 2020-12-02 NOTE — PROGRESS NOTES
Maria A Waterman presents today for a HST instruction and demonstration. Questions were asked and answers given. He was able to return demonstration and verbalized understanding. The sleep center control room phone number was provided incase questions arise during her study. Informed patient to call 911 in case of an emergency. He states he will return the unit tomorrow.    COVID screen done and negative

## 2020-12-03 LAB — STATUS: NORMAL

## 2020-12-14 NOTE — PROGRESS NOTES
[address] [place] [address]                               SLEEP STUDY REPORT    PATIENT NAME: Griffith Seip                      :        1955  MED REC NO:   [id number]                           ROOM:  ACCOUNT NO:   [de-identified]                           ADMIT DATE: 2020  PROVIDER:     YUNIOR Palencia Chana STUDY:  2020    HOME SLEEP STUDY REPORT    REFERRING PROVIDER:  Jacob Mckeon DO    HISTORY OF PRESENT ILLNESS:  The patient is a 82-year-old gentleman with  complaints of snoring, nonrestorative sleep, chronic fatigue. Weight  221 pounds, height 70 inches, BMI 31.7. METHODS:  The patient underwent Type III Portable Monitoring Sleep Study  including the simultaneous recording of oral-nasal airflow, rib and  abdominal respiratory effort, pulse rate, oxygen saturation, left and  right leg movement, and body position. Scoring criteria is consistent  with the current published AASM standards for scoring of apneas and  hypopneas 4A. DETAILS OF THE STUDY:  The patient came by the sleep lab and picked up  his HST unit #7073. He was given instructions how to set it up. The  unit was returned the next day. The information was successfully  downloaded and scored. Total recording time 442 minutes. Lights off  time 10:44 p.m., lights on time 06:07 a.m. RESPIRATORY SUMMARY:  55 obstructive apneas, 8 central apneas, 3 mixed  apneas, 55 obstructive hypopneas for an DENISE of 16.4.  81 of the events  occurred during supine time and 40 during non-supine time. PULSE OXIMETRY SUMMARY:  Mean oxygen saturation 94.9%, lowest oxygen  saturation 84%. There were 0.1 seconds of oxygen saturation below 88%. BODY POSITION SUMMARY:  230 minutes supine, 228 minutes non-supine, 12.6  minutes upright time. ECG SUMMARY:  The patient remained in normal sinus rhythm with PVCs. ASSESSMENT:  Obstructive sleep apnea with an DENISE of 16.4. The  obstructive index was 14.9 and the central index was 1.1. RECOMMENDATIONS:  APAP therapies, the patient will be contacted and will  be followed up in the sleep clinic to set up APAP. Follow up in the  sleep clinic eight weeks after initiating PAP therapies to review  download.         Elizabeth Cheney M.D.    D: 12/13/2020 17:37:09       T: 12/13/2020 21:32:03     [email or web address]  Job#: [id number]     Doc#: [id number]    CC:

## 2020-12-17 ENCOUNTER — OFFICE VISIT (OUTPATIENT)
Dept: PULMONOLOGY | Age: 65
End: 2020-12-17
Payer: COMMERCIAL

## 2020-12-17 VITALS
OXYGEN SATURATION: 98 % | HEART RATE: 80 BPM | BODY MASS INDEX: 32.64 KG/M2 | DIASTOLIC BLOOD PRESSURE: 78 MMHG | TEMPERATURE: 97 F | SYSTOLIC BLOOD PRESSURE: 122 MMHG | HEIGHT: 70 IN | WEIGHT: 228 LBS

## 2020-12-17 PROCEDURE — 99214 OFFICE O/P EST MOD 30 MIN: CPT | Performed by: NURSE PRACTITIONER

## 2020-12-17 NOTE — PATIENT INSTRUCTIONS
Patient Education        Sleep Apnea: Care Instructions  Your Care Instructions     Sleep apnea means that you frequently stop breathing for 10 seconds or longer during sleep. It can be mild to severe, based on the number of times an hour that you stop breathing or have slowed breathing. Blocked or narrowed airways in your nose, mouth, or throat can cause sleep apnea. Your airway can become blocked when your throat muscles and tongue relax during sleep. You can treat sleep apnea at home by making lifestyle changes. You also can use a CPAP breathing machine that keeps tissues in the throat from blocking your airway. Or your doctor may suggest that you use a breathing device while you sleep. It helps keep your airway open. This could be a device that you put in your mouth. In some cases, surgery may be needed to remove enlarged tissues in the throat. Follow-up care is a key part of your treatment and safety. Be sure to make and go to all appointments, and call your doctor if you are having problems. It's also a good idea to know your test results and keep a list of the medicines you take. How can you care for yourself at home? · Lose weight, if needed. It may reduce the number of times you stop breathing or have slowed breathing. · Sleep on your side. It may stop mild apnea. If you tend to roll onto your back, sew a pocket in the back of your pajama top. Put a tennis ball into the pocket, and stitch the pocket shut. This will help keep you from sleeping on your back. · Avoid alcohol and medicines such as sleeping pills and sedatives before bed. · Do not smoke. Smoking can make sleep apnea worse. If you need help quitting, talk to your doctor about stop-smoking programs and medicines. These can increase your chances of quitting for good. · Prop up the head of your bed 4 to 6 inches by putting bricks under the legs of the bed.   · Treat breathing problems, such as a stuffy nose, caused by a cold or allergies. · Try a continuous positive airway pressure (CPAP) breathing machine if your doctor recommends it. The machine keeps your airway open when you sleep. · If CPAP does not work for you, ask your doctor if you can try other breathing machines. A bilevel positive airway pressure machine uses one type of air pressure for breathing in and another type for breathing out. Another device raises or lowers air pressure as needed while you breathe. · Talk to your doctor if:  ? Your nose feels dry or bleeds when you use one of these machines. You may need to increase moisture in the air. A humidifier may help. ? Your nose is runny or stuffy from using a breathing machine. Decongestants or a corticosteroid nasal spray may help. ? You are sleepy during the day and it gets in the way of the normal things you do. Do not drive when you are drowsy. When should you call for help? Watch closely for changes in your health, and be sure to contact your doctor if:    · You still have sleep apnea even though you have made lifestyle changes.     · You are thinking of trying a device such as CPAP.     · You are having problems using a CPAP or similar machine. Where can you learn more? Go to https://[employer]onair.[employer]cer. org and sign in to your [employer] account. Enter V181 in the [employer] box to learn more about \"Sleep Apnea: Care Instructions. \"     If you do not have an account, please click on the \"Sign Up Now\" link. Current as of: February 24, 2020               Content Version: 12.6  © 2006-2020 [employer]. Care instructions adapted under license by [facility] ([facility]). If you have questions about a medical condition or this instruction, always ask your healthcare professional. [place] [address] any warranty or liability for your use of this information.

## 2020-12-17 NOTE — PROGRESS NOTES
[facility]    Jessica Perdue, 63 y.o.  [id number]      Pt of [facility]  Nurses Notes   Patient is here for home sleep study follow up. Study Results  Initial Study Date -  15. 2.20  AHI -  16.4    Total Events - 121  (Apneas  66  Hypopneas 55  Central  8)  (Total Recorded Time - 442.2 min)  Time with Sats below 88% - 0.1 min  Weight 221 lbs  Neck 18.25 inches  Mallampati III  ESS 9  SAQLI 43    Interval History       Jessica Perdue is a 63 y.o. old male who comes in to review the results of his recent sleep study, to answer questions and to explore options for treatment. Referred for snoring, witnessed apneas and significant EDS, falling asleep at work.     PMHx  Past Medical History:   Diagnosis Date    GERD (gastroesophageal reflux disease)     Other hyperlipidemia      Past Surgical History:   Procedure Laterality Date    APPENDECTOMY  1983??    COLONOSCOPY  3-2016    CYSTOSCOPY Left 7/12/15    Left Ureteroscopy, Pyelogram, Left Ureteral Dilation, Stent Insertion - Dr. Claudine Otero  10/04/2016    Robotic Right Inguinal Hernia Repair, Umbilical Hernia Repair - Dr. Janak Tolliver    LITHOTRIPSY Left 3-18-16    LT ESWL    TONSILLECTOMY      as a child     Social History     Tobacco Use    Smoking status: Never Smoker    Smokeless tobacco: Never Used   Substance Use Topics    Alcohol use: Yes     Comment: social    Drug use: No     Family History   Problem Relation Age of Onset    Sleep Apnea Father      Allergies  No Known Allergies  Meds  Current Outpatient Medications   Medication Sig Dispense Refill    omeprazole (PRILOSEC) 20 MG delayed release capsule Take 20 mg by mouth daily      atorvastatin (LIPITOR) 20 MG tablet Take 1 tablet by mouth daily 30 tablet 5    minocycline (MINOCIN;DYNACIN) 100 MG capsule Take 100 mg by mouth 2 times daily      NONFORMULARY Urinary Tract Control      calcium carbonate (TUMS) 500 MG chewable tablet Take 1 tablet by mouth daily as needed for Heartburn       No current facility-administered medications for this visit. ROS  Review of Systems  General/Constitutional: No recent loss of weight or appetite changes. No fever or chills. HENT: Negative. Eyes: Negative. Upper respiratory tract: No nasal stuffiness or post nasal drip. Lower respiratory tract/ lungs: No cough or sputum production. No hemoptysis. Cardiovascular: No palpitations or chest pain. Gastrointestinal: No nausea or vomiting. Neurological: No focal neurologiacal weakness. Extremities: No edema. Musculoskeletal: No complaints. Genitourinary: No complaints. Hematological: Negative. Psychiatric/Behavioral: Negative. Skin: No itching. Exam  Vitals -  /78 (Site: Left Upper Arm, Position: Sitting, Cuff Size: Medium Adult)   Pulse 80   Temp 97 °F (36.1 °C) (Temporal)   Ht 5' 10\" (1.778 m)   Wt 228 lb (103.4 kg)   SpO2 98% Comment: r/a  BMI 32.71 kg/m²    Body mass index is 32.71 kg/m². Oxygen level - 94.9  Physical Exam   General Appearance - Moderately built, moderately nourished, in no acute distress. HEENT - Head is normocephalic, atraumatic. PERRL. Oral mucosa pink and moist, no oral thrush. Mallampati Score - III (soft palate, base of uvula visible). Neck - Supple, symmetrical, trachea midline and soft. Lungs - Clear to auscultation, no wheezes, rales or rhonchi, aeration good. Cardiovascular - Heart sounds are normal. Regular rhythm normal rate without murmur, gallop or rub. Abdomen - Soft, nontender, non-distended. Neurologic - Alert and oriented x 3. Skin - No bruising or bleeding. Extremities - No cyanosis, clubbing or edema. Assessment   Diagnosis Orders   1. BRENDA (obstructive sleep apnea)  DME Order for CPAP as OP   2. Snoring     3. Hypersomnia     4. PVC (premature ventricular contraction)        Recommendations  I reviewed the home sleep study results in detail with Vlad Rocha.  We discussed various treatment options including positional therapy, OAT and PAP therapies along with the benefits and limitations of each. We also discussed the health risks with untreated BRENDA. Due to the severity of his symptoms and having PVC's with sleep, PAP therapies is recommended. He agrees to proceed with APAP set up with mask fitting. Follow up 8 weeks after PAP set up with download.     Electronically signed by OLGA Morocho CNP on 12/17/2020 at 10:52 AM

## 2021-02-25 ENCOUNTER — OFFICE VISIT (OUTPATIENT)
Dept: PULMONOLOGY | Age: 66
End: 2021-02-25
Payer: COMMERCIAL

## 2021-02-25 VITALS
HEIGHT: 70 IN | TEMPERATURE: 98.2 F | HEART RATE: 73 BPM | OXYGEN SATURATION: 97 % | BODY MASS INDEX: 33.5 KG/M2 | DIASTOLIC BLOOD PRESSURE: 70 MMHG | SYSTOLIC BLOOD PRESSURE: 118 MMHG | WEIGHT: 234 LBS

## 2021-02-25 DIAGNOSIS — Z99.89 OSA ON CPAP: Primary | ICD-10-CM

## 2021-02-25 DIAGNOSIS — G47.00 INSOMNIA, UNSPECIFIED TYPE: ICD-10-CM

## 2021-02-25 DIAGNOSIS — G47.33 OSA ON CPAP: Primary | ICD-10-CM

## 2021-02-25 DIAGNOSIS — M25.50 ARTHRALGIA, UNSPECIFIED JOINT: ICD-10-CM

## 2021-02-25 DIAGNOSIS — R51.9 NONINTRACTABLE HEADACHE, UNSPECIFIED CHRONICITY PATTERN, UNSPECIFIED HEADACHE TYPE: ICD-10-CM

## 2021-02-25 DIAGNOSIS — Z72.821 POOR SLEEP HYGIENE: ICD-10-CM

## 2021-02-25 PROCEDURE — 99214 OFFICE O/P EST MOD 30 MIN: CPT | Performed by: NURSE PRACTITIONER

## 2021-02-25 RX ORDER — GABAPENTIN 100 MG/1
CAPSULE ORAL
Qty: 90 CAPSULE | Refills: 2 | Status: SHIPPED | OUTPATIENT
Start: 2021-02-25 | End: 2021-03-12

## 2021-02-25 NOTE — PROGRESS NOTES
[place] [facility] [address]         [id number]  2/25/2021   Chief Complaint   Patient presents with    Follow-up     2 mo f/u auto adjust with download        Pt of Dr. Ruel AREVALO Download:   Eris Beal or initial AHI: 16.4   Date of initial study: 12/2/20  Weight of initial study: 221 lbs  [x] Compliant  100%   [] Noncompliant 0%     PAP Type Auto  Level  8/20   Avg Hrs/Day: 7 hr 56 min  AHI: 1.3  Leak: 2.6   Recorded compliance dates: 1/25/21-2/23/21   Machine/Mfg: Resmed Interface: FFM    Provider:  [x]-HME  []Matilde [][facility]  [][facility]         [][facility] []Other:     Neck Size: 18.25  Mallampati: Mallampati 3  ESS:  5 (down from 9)  SAQLI: 87 (up from, 43)    Here is a scan of the most recent download:            Presentation:   Lois Luu presents for sleep medicine follow up for obstructive sleep apnea. Since the last visit, Lois Luu started on APAP and is struggling with comfort, impacting his sleep. He has a sensitive head, can not even wear hats, and struggling with headgear. Has to pull tight due to leakage. Waking with headaches. Has always had issues staying asleep, making things worse. Currently taking Advil PM to aid with sleep/tolerance which does help but does not want to take consistently. Tried Melatonin, no benefit. Referred for snoring, witnessed apneas and significant EDS, falling asleep at work. Hx GERD. Equipment issues: The pressure is acceptable, the mask is somewhat acceptable and he is using the humidity. Sleep issues:  Do you feel better? No.  More rested? No.  Better concentration? NA. Progress History:   Since last visit any new medical issues? No.  New ER or hospitlal visits? No.  Any new or changes in medicines? No.  Any new sleep medicines?  No.      Past Medical History:   Diagnosis Date    GERD (gastroesophageal reflux disease)     BRENDA on CPAP     Other hyperlipidemia          Past Surgical History:   Procedure Laterality Date [address]    COLONOSCOPY  3-2016    CYSTOSCOPY Left 7/12/15    Left Ureteroscopy, Pyelogram, Left Ureteral Dilation, Stent Insertion - Dr. Shruti Zhang  10/04/2016    Robotic Right Inguinal Hernia Repair, Umbilical Hernia Repair - Dr. David Vela    LITHOTRIPSY Left 3-18-16    LT ESWL    TONSILLECTOMY      as a child         Social History     Tobacco Use    Smoking status: Never Smoker    Smokeless tobacco: Never Used   Substance Use Topics    Alcohol use: Yes     Comment: social    Drug use: No       Family History   Problem Relation Age of Onset    Sleep Apnea Father          No Known Allergies      Current Outpatient Medications   Medication Sig Dispense Refill    gabapentin (NEURONTIN) 100 MG capsule 100 mg for 3 nights, then if needed, 200 mg for 3 nights, then if needed 300 mg then call. 90 capsule 2    omeprazole (PRILOSEC) 20 MG delayed release capsule Take 20 mg by mouth daily      minocycline (MINOCIN;DYNACIN) 100 MG capsule Take 100 mg by mouth 2 times daily      NONFORMULARY Urinary Tract Control      calcium carbonate (TUMS) 500 MG chewable tablet Take 1 tablet by mouth daily as needed for Heartburn       No current facility-administered medications for this visit. Review of Systems   General/Constitutional: No recent loss of weight or appetite changes. No fever or chills. HENT: Negative. Eyes: Negative. Upper respiratory tract: No nasal stuffiness or post nasal drip. Lower respiratory tract/ lungs: No cough or sputum production. No hemoptysis. Cardiovascular: No palpitations or chest pain. Gastrointestinal: No nausea or vomiting. Neurological: No focal neurologiacal weakness. Extremities: No increased edema. Musculoskeletal: No new complaints. Genitourinary: No complaints. Hematological: Negative. Psychiatric/Behavioral: Negative. Skin: No itching. Physical Exam:    BMI: Body mass index is 33.58 kg/m².     Wt Readings from Last 3 Encounters:   02/25/21 234 lb (106.1 kg)   12/17/20 228 lb (103.4 kg)   11/03/20 221 lb 12.8 oz (100.6 kg)     Weight: gain of 13 lbs since study  Vitals: /70 (Site: Left Upper Arm, Position: Sitting, Cuff Size: Medium Adult)   Pulse 73   Temp 98.2 °F (36.8 °C)   Ht 5' 10\" (1.778 m)   Wt 234 lb (106.1 kg)   SpO2 97% Comment: on room air  BMI 33.58 kg/m²         General Appearance - Moderately built, moderately nourished, in no acute distress. HEENT - Head is normocephalic, atraumatic. PERRL. Oral mucosa pink and moist, no oral thrush. Mallampati Score - III (soft palate, base of uvula visible). Neck - Supple, symmetrical, trachea midline and soft. Lungs - Clear to auscultation, no wheezes, rales or rhonchi, aeration good. Cardiovascular - Heart sounds are normal. Regular rhythm normal rate without murmur, gallop or rub. Abdomen - Soft, nontender, non-distended. Neurologic - Alert and oriented x 3. Skin - No bruising or bleeding. Extremities - No cyanosis, clubbing or edema. ASSESSMENT/DIAGNOSIS     Diagnosis Orders   1. BRENDA on CPAP     2. Insomnia, unspecified type     3. Poor sleep hygiene     4. Nonintractable headache, unspecified chronicity pattern, unspecified headache type     5. Arthralgia, unspecified joint              Plan     Do you need any equipment today? No.  -Download reviewed. Due to issues, continue current settings.  -Discussed alternative options to treat BRENDA, such as oral appliance, but may not control snoring.  -Consider ENT referral.  -He would like to continue trying with CPAP. -Check with DME on alternate mask/headgear style or recommendations to aid with comfort.  -Discussed ways to improve leakage (possibly loosen headgear).   -Has facial hair.  -In regards to sleep issues, no benefit with Melatonin.  -Educated on sleep hygiene measures.  -Discussed option for sleep to aid with adjustment: Will try Neurontin for multiple purposes: sedating effects, has joint pain issues and will aid with headaches.  -Start 100 mg nightly, titrate as needed. Educated on indications, side effects and call with concerns. - He was advised to continue current positive airway pressure therapy with above described pressure. - He was advised to keep good compliance with current recommended pressure to get optimal results and clinical improvement.  - Educated on the health risks with untreated sleep apnea. - Recommend 7-9 hours of sleep with PAP treatment. - Re-educated on proper sleep hygiene. - He was advised to call [employer] company regarding supplies if needed. - He is to call my office for earlier appointment if needed for worsening of sleep symptoms.   - He was instructed on weight loss. Bret Turner was educated about my impression and plan and verbalizes understanding. We will see Michelle Mills back in: 3 months with download. 40 minutes was spent on this visit with > 50% being face to face obtaining HPI, examining patient, reviewing test results/download, educating and coordinating a treatment plan. Had many questions and concerns he requested to address.     Electronically signed by OLGA Fairchild CNP on 2/25/2021 at 1:56 PM

## 2021-02-25 NOTE — PATIENT INSTRUCTIONS
Patient Education        gabapentin  Pronunciation:  GA ba PEN tin  Brand:  Gralise, Horizant, Neurontin  What is the most important information I should know about gabapentin? Gabapentin can cause life-threatening breathing problems, especially in older adults or people with COPD. Seek emergency medical attention if you have very slow breathing. Some people have thoughts about suicide or behavior changes while taking gabapentin. Stay alert to changes in your mood or symptoms. Report any new or worsening symptoms to your doctor. Avoid driving or hazardous activity until you know how this medicine will affect you. Dizziness or drowsiness can cause falls, accidents, or severe injuries. Do not stop using gabapentin suddenly, even if you feel fine. What is gabapentin? Gabapentin is used together with other medicines to treat partial seizures in adults and children at least 1years old. Gabapentin is also used to treat nerve pain caused by herpes virus or shingles (herpes zoster) in adults. Use only the brand and form of gabapentin your doctor has prescribed. Check your medicine each time you get a refill to make sure you receive the correct form. Gralise is used only to treat nerve pain. Horizant is used to treat nerve pain and restless legs syndrome (RLS). Neurontin is used to treat nerve pain and seizures. Gabapentin may also be used for purposes not listed in this medication guide. What should I discuss with my healthcare provider before taking gabapentin? You should not use gabapentin if you are allergic to it.   Tell your doctor if you have ever had:  · lung disease, such as chronic obstructive pulmonary disease (COPD);  · kidney disease (or if you are on dialysis);  · diabetes;  · depression, a mood disorder, or suicidal thoughts or actions;  · a drug addiction;  · a seizure (unless you take gabapentin to treat seizures);  · liver disease;  · heart disease; or  · (for patients with RLS) if you are a day sleeper or work a night shift. Some people have thoughts about suicide while taking this medicine. Children taking gabapentin may have behavior changes. Stay alert to changes in your mood or symptoms. Report any new or worsening symptoms to your doctor. It is not known whether this medicine will harm an unborn baby. Tell your doctor if you are pregnant or plan to become pregnant. Seizure control is very important during pregnancy, and having a seizure could harm both mother and baby. Do not start or stop taking gabapentin for seizures without your doctor's advice, and tell your doctor right away if you become pregnant. It may not be safe to breastfeed while using this medicine. Ask your doctor about any risk. How should I take gabapentin? Follow all directions on your prescription label. Do not take this medicine in larger or smaller amounts or for longer than recommended. If your doctor changes your brand, strength, or type of gabapentin, your dosage needs may change. Ask your pharmacist if you have any questions about the new kind of gabapentin you receive at the pharmacy. The Horizant brand of gabapentin should not be taken during the day. For best results, take Horizant with food at about 5:00 in the evening. Both Gralise and Horizant should be taken with food. Neurontin can be taken with or without food. If you break a Neurontin tablet and take only half of it, take the other half at your next dose. Any tablet that has been broken should be used as soon as possible or within a few days. Swallow the capsule  or tablet  whole and do not crush, chew, break, or open it. Measure liquid medicine carefully. Use the dosing syringe provided, or use a medicine dose-measuring device (not a kitchen spoon). Do not stop using gabapentin suddenly, even if you feel fine. Stopping suddenly may cause increased seizures. Follow your doctor's instructions about tapering your dose.   In case of emergency, wear or carry medical identification to let others know you have seizures. This medicine can cause unusual results with certain medical tests. Tell any doctor who treats you that you are using gabapentin. Store gabapentin tablets and capsules at room temperature away from light and moisture. Store the liquid medicine in the refrigerator. Do not freeze. What happens if I miss a dose? Take the medicine as soon as you can, but skip the missed dose if it is almost time for your next dose. Do not take two doses at one time. If you take Horizant:  Skip the missed dose and use your next dose at the regular time. Do not use two doses of Horizant at one time. What happens if I overdose? Seek emergency medical attention or call the Poison Help line at 1-514.951.8506. What should I avoid while taking gabapentin? Avoid driving or hazardous activity until you know how this medicine will affect you. Your reactions could be impaired. Dizziness or drowsiness can cause falls, accidents, or severe injuries. Avoid taking an antacid within 2 hours before or after you take gabapentin. Antacids can make it harder for your body to absorb gabapentin. Avoid drinking alcohol while taking gabapentin. What are the possible side effects of gabapentin? Get emergency medical help if you have signs of an allergic reaction: hives; difficult breathing; swelling of your face, lips, tongue, or throat. Seek medical treatment if you have a serious drug reaction that can affect many parts of your body. Symptoms may include: skin rash, fever, swollen glands, muscle aches, severe weakness, unusual bruising, upper stomach pain, or yellowing of your skin or eyes.   Report any new or worsening symptoms to your doctor, such as: mood or behavior changes, anxiety, panic attacks, trouble sleeping, or if you feel impulsive, irritable, agitated, hostile, aggressive, restless, hyperactive (mentally or physically), depressed, or have thoughts about suicide or hurting yourself. Call your doctor at once if you have:  · weak or shallow breathing;  · blue-colored skin, lips, fingers, and toes;  · confusion, extreme drowsiness or weakness;  · problems with balance or muscle movement;  · unusual or involuntary eye movements; or  · increased seizures. Gabapentin can cause life-threatening breathing problems. A person caring for you should seek emergency medical attention if you have slow breathing with long pauses, blue colored lips, or if you are hard to wake up. Breathing problems may be more likely in older adults or in people with COPD. Some side effects are more likely in children taking gabapentin. Contact your doctor if the child taking this medicine has any of the following side effects:  · changes in behavior;  · memory problems;  · trouble concentrating; or  · acting restless, hostile, or aggressive. Common side effects may include:  · headache;  · dizziness, drowsiness, tiredness;  · problems with balance or eye movements; or  · (in children) fever, nausea, vomiting. This is not a complete list of side effects and others may occur. Call your doctor for medical advice about side effects. You may report side effects to FDA at 2-189-FDA-5979. What other drugs will affect gabapentin? Using gabapentin with other drugs that slow your breathing can cause dangerous side effects or death. Ask your doctor before using opioid medication, a sleeping pill, cold or allergy medicine, a muscle relaxer, or medicine for anxiety or seizures. Other drugs may affect gabapentin, including prescription and over-the-counter medicines, vitamins, and herbal products. Tell your doctor about all your current medicines and any medicine you start or stop using. Where can I get more information? Your pharmacist can provide more information about gabapentin.   Remember, keep this and all other medicines out of the reach of children, never share your medicines with others, and use this medication only for the indication prescribed. Every effort has been made to ensure that the information provided by [address] is accurate, up-to-date, and complete, but no guarantee is made to that effect. Drug information contained herein may be time sensitive. [facility] information has been compiled for use by healthcare practitioners and consumers in the [place] and therefore [facility] does not warrant that uses outside of the [place] are appropriate, unless specifically indicated otherwise. [facility]'s drug information does not endorse drugs, diagnose patients or recommend therapy. [facility]'s drug information is an informational resource designed to assist licensed healthcare practitioners in caring for their patients and/or to serve consumers viewing this service as a supplement to, and not a substitute for, the expertise, skill, knowledge and judgment of healthcare practitioners. The absence of a warning for a given drug or drug combination in no way should be construed to indicate that the drug or drug combination is safe, effective or appropriate for any given patient. [facility] does not assume any responsibility for any aspect of healthcare administered with the aid of information [facility] provides. The information contained herein is not intended to cover all possible uses, directions, precautions, warnings, drug interactions, allergic reactions, or adverse effects. If you have questions about the drugs you are taking, check with your doctor, nurse or pharmacist.  Copyright 1347-9752 [address]. Version: 16.01. Revision date: 4/30/2020. Care instructions adapted under license by [facility] ([facility]). If you have questions about a medical condition or this instruction, always ask your healthcare professional. [place] [address] any warranty or liability for your use of this information.

## 2021-03-09 ENCOUNTER — TELEPHONE (OUTPATIENT)
Dept: PULMONOLOGY | Age: 66
End: 2021-03-09

## 2021-03-09 NOTE — TELEPHONE ENCOUNTER
Pt called in stating the gabapentin is not working for him period. He stated he would rather take melatonin and a night time pain reliever like he was doing before. The pap is not working for him either. Do you have another suggestion? He is just not able to sleep at all. Can you take care of this since Cortes Teran is out please?

## 2021-03-12 RX ORDER — TRAZODONE HYDROCHLORIDE 50 MG/1
50 TABLET ORAL NIGHTLY
Qty: 30 TABLET | Refills: 0 | Status: SHIPPED | OUTPATIENT
Start: 2021-03-12 | End: 2021-04-02 | Stop reason: HOSPADM

## 2021-03-12 NOTE — TELEPHONE ENCOUNTER
Discontinue Neurontin medication. Start Trazodone nightly, new script sent to [facility], continue to try to use PAP with new medication .  Keep f/u as scheduled, call for sooner appt if continued issues

## 2021-03-17 ENCOUNTER — TELEPHONE (OUTPATIENT)
Dept: PULMONOLOGY | Age: 66
End: 2021-03-17

## 2021-03-17 NOTE — TELEPHONE ENCOUNTER
Patient returned pap AMA. Message left per Maryuri to offer patient an appointment to discuss or to see if he wanted to cancel future appointments.

## 2021-03-18 ENCOUNTER — OFFICE VISIT (OUTPATIENT)
Dept: FAMILY MEDICINE CLINIC | Age: 66
End: 2021-03-18
Payer: COMMERCIAL

## 2021-03-18 VITALS
BODY MASS INDEX: 33.41 KG/M2 | SYSTOLIC BLOOD PRESSURE: 132 MMHG | OXYGEN SATURATION: 98 % | HEIGHT: 70 IN | DIASTOLIC BLOOD PRESSURE: 86 MMHG | TEMPERATURE: 97.6 F | HEART RATE: 76 BPM | WEIGHT: 233.4 LBS | RESPIRATION RATE: 18 BRPM

## 2021-03-18 DIAGNOSIS — E78.5 DYSLIPIDEMIA: ICD-10-CM

## 2021-03-18 DIAGNOSIS — R10.12 LUQ PAIN: Primary | ICD-10-CM

## 2021-03-18 DIAGNOSIS — M26.629 TMJ SYNDROME: ICD-10-CM

## 2021-03-18 PROCEDURE — 99214 OFFICE O/P EST MOD 30 MIN: CPT | Performed by: FAMILY MEDICINE

## 2021-03-18 RX ORDER — TIZANIDINE 2 MG/1
2 TABLET ORAL EVERY 8 HOURS PRN
Qty: 90 TABLET | Refills: 0 | Status: SHIPPED | OUTPATIENT
Start: 2021-03-18 | End: 2021-10-11

## 2021-03-18 ASSESSMENT — PATIENT HEALTH QUESTIONNAIRE - PHQ9
SUM OF ALL RESPONSES TO PHQ QUESTIONS 1-9: 0
DEPRESSION UNABLE TO ASSESS: FUNCTIONAL CAPACITY MOTIVATION LIMITS ACCURACY
SUM OF ALL RESPONSES TO PHQ QUESTIONS 1-9: 0

## 2021-03-18 NOTE — PROGRESS NOTES
Liliane Bcaa is a 63 y.o. male that presents for     Chief Complaint   Patient presents with    6 Month Follow-Up     rt sided ear and jaw pain ongoing 2 weeks     Abdominal Pain     llq under ribcage  started 4 weeks ago       /86   Pulse 76   Temp 97.6 °F (36.4 °C)   Resp 18   Ht 5' 10\" (1.778 m)   Wt 233 lb 6.4 oz (105.9 kg)   SpO2 98%   BMI 33.49 kg/m²       HPI:    Notes right sided jaw/ear pain x several weeks. Abdominal Pain    HPI:       Symptoms have been present for 1 month(s). Location:  left upper quadrant    Description: sharp, persistent pain,    Provoking Factors - forward flexion, twisting  Alleviating Factors - trunk extension  Severity - moderate   Radiation: No    Change in pain with eating? No  Change in pain with BM? No  Nausea? no  Vomiting? no  Diarrhea? no  Constipation? no  Blood in Stools? yes  Dysuria/Change in Urinary Frequency/Hematuria? no  Back Pain? no    Review of Systems  Constitutional:   Negative for  chills, fever and changes in weight      States that he is not sleeping well still. He was dx'd with BRENDA but states that CPAP has not been helping with his sleep. Dyslipidemia    Current Medication regimen - none currently, would like to try to   Tolerating medications well? Yes  Personal History of CAD? No   Hx of CAD in first degree relatives? No  Current smoker? No  History of HTN? No  History of DM? No    Shortness of breath or chest pain? No  Neurologic changes? No  Extremity edema?  No    Lab Results   Component Value Date    LDLCALC 159 09/17/2020     BP Readings from Last 3 Encounters:   03/18/21 132/86   02/25/21 118/70   12/17/20 122/78     Wt Readings from Last 3 Encounters:   03/18/21 233 lb 6.4 oz (105.9 kg)   02/25/21 234 lb (106.1 kg)   12/17/20 228 lb (103.4 kg)         Health Maintenance   Topic Date Due    Hepatitis C screen  Never done    HIV screen  Never done    COVID-19 Vaccine (1) Never done    DTaP/Tdap/Td vaccine (1 - Tdap) Never done    Shingles Vaccine (1 of 2) Never done    Flu vaccine (1) Never done    Lipid screen  09/17/2025    Colon cancer screen colonoscopy  03/24/2026    Hepatitis A vaccine  Aged Out    Hepatitis B vaccine  Aged Out    Hib vaccine  Aged Out    Meningococcal (ACWY) vaccine  Aged Out    Pneumococcal 0-64 years Vaccine  Aged Out       Past Medical History:   Diagnosis Date    GERD (gastroesophageal reflux disease)     BRENDA on CPAP     Other hyperlipidemia        Past Surgical History:   Procedure Laterality Date    APPENDECTOMY  1983??    COLONOSCOPY  3-2016    CYSTOSCOPY Left 7/12/15    Left Ureteroscopy, Pyelogram, Left Ureteral Dilation, Stent Insertion - Dr. Ibrahim La Pryor  10/04/2016    Robotic Right Inguinal Hernia Repair, Umbilical Hernia Repair - Dr. Gonzalez Back    LITHOTRIPSY Left 3-18-16    LT ESWL    TONSILLECTOMY      as a child       Social History     Tobacco Use    Smoking status: Never Smoker    Smokeless tobacco: Never Used   Substance Use Topics    Alcohol use: Yes     Comment: social    Drug use: No       Family History   Problem Relation Age of Onset    Sleep Apnea Father          I have reviewed the patient's past medical history, past surgical history, allergies, medications, social and family history and I have made updates where appropriate.     Review of Systems        PHYSICAL EXAM:  /86   Pulse 76   Temp 97.6 °F (36.4 °C)   Resp 18   Ht 5' 10\" (1.778 m)   Wt 233 lb 6.4 oz (105.9 kg)   SpO2 98%   BMI 33.49 kg/m²     General Appearance: well developed and well- nourished, in no acute distress  Head: normocephalic and atraumatic  Extremities: no cyanosis, clubbing or edema of the lower extremities  Psych:  Normal affect without evidence of depression oranxiety, insight and judgement are appropriate, memory appears intact  Skin: warm and dry, no rash or erythema      ASSESSMENT & PLAN  Josh Rowell was seen today for 6 month follow-up and abdominal pain.    Diagnoses and all orders for this visit:    LUQ pain  -     tiZANidine (ZANAFLEX) 2 MG tablet; Take 1 tablet by mouth every 8 hours as needed (muscle pain)    TMJ syndrome    Dyslipidemia    -LUQ pain appears consistent with a muscular pain, will trial zanaflex and see if this helps with the symptoms  -Zanaflex may help with TMJ sx as well. Advised on conservative management as well.  -Other chronic issues are stable, continue current medications  -Advised to call if any issues        Return in about 6 months (around 9/18/2021), or if symptoms worsen or fail to improve. Controlled Substance Monitoring:    Acute and Chronic Pain Monitoring:   RX Monitoring 2/25/2021   Periodic Controlled Substance Monitoring No signs of potential drug abuse or diversion identified. ;Possible medication side effects, risk of tolerance/dependence & alternative treatments discussed. C/ Eras 47 received counseling on the following healthy behaviors: medication adherence  Reviewed prior labs and health maintenance. Continue current medications, diet and exercise. Discussed use, benefit, and side effects of prescribed medications. Barriers to medication compliance addressed. Patient given educational materials - see patient instructions. All patient questions answered. Patient voiced understanding.

## 2021-04-02 ENCOUNTER — OFFICE VISIT (OUTPATIENT)
Dept: PULMONOLOGY | Age: 66
End: 2021-04-02
Payer: COMMERCIAL

## 2021-04-02 VITALS
OXYGEN SATURATION: 98 % | HEIGHT: 70 IN | HEART RATE: 68 BPM | BODY MASS INDEX: 32.75 KG/M2 | TEMPERATURE: 97.7 F | WEIGHT: 228.8 LBS | DIASTOLIC BLOOD PRESSURE: 72 MMHG | SYSTOLIC BLOOD PRESSURE: 132 MMHG

## 2021-04-02 DIAGNOSIS — G47.00 INSOMNIA, UNSPECIFIED TYPE: ICD-10-CM

## 2021-04-02 DIAGNOSIS — Z78.9 CPAP VENTILATION TREATMENT NOT TOLERATED: ICD-10-CM

## 2021-04-02 DIAGNOSIS — R51.9 NONINTRACTABLE HEADACHE, UNSPECIFIED CHRONICITY PATTERN, UNSPECIFIED HEADACHE TYPE: Primary | ICD-10-CM

## 2021-04-02 DIAGNOSIS — G47.33 OSA (OBSTRUCTIVE SLEEP APNEA): ICD-10-CM

## 2021-04-02 PROCEDURE — 99214 OFFICE O/P EST MOD 30 MIN: CPT | Performed by: NURSE PRACTITIONER

## 2021-04-02 RX ORDER — DOXEPIN HYDROCHLORIDE 25 MG/1
25 CAPSULE ORAL NIGHTLY
Qty: 30 CAPSULE | Refills: 3 | Status: SHIPPED | OUTPATIENT
Start: 2021-04-02 | End: 2021-10-11

## 2021-04-02 NOTE — PROGRESS NOTES
[facility]      [facility]         [id number]  4/2/2021   Chief Complaint   Patient presents with    Follow-up     BRENDA 1 month sleep follow up. Patient returned pap AMA wants to discuss options        Pt of Dr. Elva Mckinney     PAP Download:   Original or initial AHI: 16.4    Date of initial study: 12/2/2020      Neck Size: 18.25  Mallampati Mallampati 3  ESS:  7  SAQLI: 55    Here is a scan of the most recent download:  None available, pt returned machine AMA    Presentation:   Jose Casiano presents for sleep medicine follow up for obstructive sleep apnea  Since the last visit, Jose Casiano , returned CPAP AMA, could not tolerate due to headaches. Can not stand anything around his head, including hats due headaches, was struglling with pressure and leaks. Continues to struggle with hypersomnia. Has tried multiple medications to help with insomnia including melatonin, Trazadone, OTC sleep aid with pain reliever, and gabapentin with no relief. Progress History:   Since last visit any new medical issues? No  New ER or hospitlal visits? No  Any new or changes in medicines? No  Any new sleep medicines? No    Review of Systems -   Review of Systems     Physical Exam:    BMI:  Body mass index is 32.83 kg/m². Wt Readings from Last 3 Encounters:   04/02/21 228 lb 12.8 oz (103.8 kg)   03/18/21 233 lb 6.4 oz (105.9 kg)   02/25/21 234 lb (106.1 kg)     Weight gained 12 lbs over 4 months  Vitals: /72 (Site: Left Upper Arm, Position: Sitting, Cuff Size: Medium Adult)   Pulse 68   Temp 97.7 °F (36.5 °C)   Ht 5' 10\" (1.778 m)   Wt 228 lb 12.8 oz (103.8 kg)   SpO2 98% Comment: on RA  BMI 32.83 kg/m²       Physical Exam      ASSESSMENT/DIAGNOSIS     Diagnosis Orders   1. Nonintractable headache, unspecified chronicity pattern, unspecified headache type  Ambulatory referral to Dentistry   2.  Insomnia, unspecified type  doxepin (SINEQUAN) 25 MG capsule    Ambulatory referral to Dentistry

## 2021-06-03 ENCOUNTER — NURSE ONLY (OUTPATIENT)
Dept: LAB | Age: 66
End: 2021-06-03

## 2021-09-20 ENCOUNTER — OFFICE VISIT (OUTPATIENT)
Dept: FAMILY MEDICINE CLINIC | Age: 66
End: 2021-09-20
Payer: COMMERCIAL

## 2021-09-20 VITALS
RESPIRATION RATE: 16 BRPM | WEIGHT: 233.6 LBS | OXYGEN SATURATION: 97 % | BODY MASS INDEX: 33.44 KG/M2 | HEART RATE: 75 BPM | HEIGHT: 70 IN | SYSTOLIC BLOOD PRESSURE: 128 MMHG | TEMPERATURE: 97.6 F | DIASTOLIC BLOOD PRESSURE: 84 MMHG

## 2021-09-20 DIAGNOSIS — E78.5 DYSLIPIDEMIA: ICD-10-CM

## 2021-09-20 DIAGNOSIS — R60.0 LOWER EXTREMITY EDEMA: Primary | ICD-10-CM

## 2021-09-20 PROCEDURE — 99214 OFFICE O/P EST MOD 30 MIN: CPT | Performed by: FAMILY MEDICINE

## 2021-09-20 RX ORDER — FUROSEMIDE 20 MG/1
20 TABLET ORAL DAILY
Qty: 30 TABLET | Refills: 5 | Status: SHIPPED | OUTPATIENT
Start: 2021-09-20 | End: 2022-09-29

## 2021-09-20 SDOH — ECONOMIC STABILITY: FOOD INSECURITY: WITHIN THE PAST 12 MONTHS, THE FOOD YOU BOUGHT JUST DIDN'T LAST AND YOU DIDN'T HAVE MONEY TO GET MORE.: NEVER TRUE

## 2021-09-20 SDOH — ECONOMIC STABILITY: FOOD INSECURITY: WITHIN THE PAST 12 MONTHS, YOU WORRIED THAT YOUR FOOD WOULD RUN OUT BEFORE YOU GOT MONEY TO BUY MORE.: NEVER TRUE

## 2021-09-20 ASSESSMENT — SOCIAL DETERMINANTS OF HEALTH (SDOH): HOW HARD IS IT FOR YOU TO PAY FOR THE VERY BASICS LIKE FOOD, HOUSING, MEDICAL CARE, AND HEATING?: NOT VERY HARD

## 2021-09-20 NOTE — PROGRESS NOTES
Gerry Ahn is a 61 y.o. male that presents for     Chief Complaint   Patient presents with    6 Month Follow-Up    Edema     legs and feet       /84   Pulse 75   Temp 97.6 °F (36.4 °C) (Infrared)   Resp 16   Ht 5' 10\" (1.778 m)   Wt 233 lb 9.6 oz (106 kg)   SpO2 97%   BMI 33.52 kg/m²       HPI    LE Edema    HPI:    Gerry Anh is complaining of swelling in both legs. Feels like symptoms are getting worse. Tends to worsen through the day. Notes that he is sitting most of the day. Symptoms have been present for 3 year(s). Inciting event? No   Weight change associated with swelling? No    Erythema? No  Tenderness? No  Shortness of breath or chest pain? No  Orthopnea? No     Hx of renal, thyroid or liver disease? No  Hx of CHF? No  Hx of thromboembolic diease? No  Medications associated with fluid retention? No      Dyslipidemia    Current Medication regimen - none  Personal History of CAD? No   Hx of CAD in first degree relatives? No  Current smoker? No  History of HTN? No  History of DM? No    Shortness of breath or chest pain? No  Neurologic changes? No  Extremity edema?  Yes    Lab Results   Component Value Date    LDLCALC 159 09/17/2020     BP Readings from Last 3 Encounters:   09/20/21 128/84   04/02/21 132/72   03/18/21 132/86     Wt Readings from Last 3 Encounters:   09/20/21 233 lb 9.6 oz (106 kg)   04/02/21 228 lb 12.8 oz (103.8 kg)   03/18/21 233 lb 6.4 oz (105.9 kg)         Health Maintenance   Topic Date Due    Hepatitis C screen  Never done    COVID-19 Vaccine (1) Never done    HIV screen  Never done    DTaP/Tdap/Td vaccine (1 - Tdap) Never done    Shingles Vaccine (1 of 2) Never done    Potassium monitoring  02/12/2021    Creatinine monitoring  02/12/2021    Flu vaccine (1) Never done    Lipid screen  09/17/2025    Colon cancer screen colonoscopy  06/03/2031    Hepatitis A vaccine  Aged Out    Hepatitis B vaccine  Aged Out    Hib vaccine  Aged Out  Meningococcal (ACWY) vaccine  Aged Out    Pneumococcal 0-64 years Vaccine  Aged Out       Past Medical History:   Diagnosis Date    GERD (gastroesophageal reflux disease)     BRENDA on CPAP     Other hyperlipidemia        Past Surgical History:   Procedure Laterality Date    APPENDECTOMY  1983??    COLONOSCOPY  3-2016    CYSTOSCOPY Left 7/12/15    Left Ureteroscopy, Pyelogram, Left Ureteral Dilation, Stent Insertion - Dr. Eduarda Nava  10/04/2016    Robotic Right Inguinal Hernia Repair, Umbilical Hernia Repair - Dr. Brenden Castano    LITHOTRIPSY Left 3-18-16    LT ESWL    TONSILLECTOMY      as a child       Social History     Tobacco Use    Smoking status: Never Smoker    Smokeless tobacco: Never Used   Substance Use Topics    Alcohol use: Yes     Comment: social    Drug use: No       Family History   Problem Relation Age of Onset    Sleep Apnea Father          I have reviewed the patient's past medical history, past surgical history, allergies, medications, social and family history and I have made updates where appropriate. Review of Systems        PHYSICAL EXAM:  /84   Pulse 75   Temp 97.6 °F (36.4 °C) (Infrared)   Resp 16   Ht 5' 10\" (1.778 m)   Wt 233 lb 9.6 oz (106 kg)   SpO2 97%   BMI 33.52 kg/m²     Physical Exam  Vitals and nursing note reviewed. Constitutional:       General: He is not in acute distress. Appearance: He is well-developed. HENT:      Head: Normocephalic and atraumatic. Right Ear: Hearing and external ear normal.      Left Ear: Hearing and external ear normal.      Nose: Nose normal.   Eyes:      General:         Right eye: No discharge. Left eye: No discharge. Conjunctiva/sclera: Conjunctivae normal.   Neck:      Thyroid: No thyromegaly. Trachea: No tracheal deviation. Cardiovascular:      Rate and Rhythm: Normal rate and regular rhythm. Heart sounds: Normal heart sounds. No murmur heard. No friction rub.  No gallop. Pulmonary:      Effort: Pulmonary effort is normal. No respiratory distress. Breath sounds: No wheezing or rales. Chest:      Chest wall: No tenderness. Musculoskeletal:         General: No deformity. Cervical back: Normal range of motion and neck supple. Right lower leg: Edema present. Left lower leg: Edema present. Lymphadenopathy:      Cervical: No cervical adenopathy. Skin:     General: Skin is warm and dry. Findings: No erythema or rash. Neurological:      Mental Status: He is alert. Motor: No abnormal muscle tone. Coordination: Coordination normal.   Psychiatric:         Behavior: Behavior normal.         Thought Content: Thought content normal.         Judgment: Judgment normal.             ASSESSMENT & PLAN  Charlette Dowd was seen today for 6 month follow-up and edema. Diagnoses and all orders for this visit:    Lower extremity edema  -     furosemide (LASIX) 20 MG tablet; Take 1 tablet by mouth daily  -     Basic Metabolic Panel; Future  -     Echocardiogram complete; Future  -     Type And Screen; Future    Dyslipidemia  -     Lipid Panel; Future    -Dependent edema appears to be worsening, will obtain TTE and labs, will also start Lasix 20 mg q daily. Will check labs in 1 week and follow up with him.    -Obtain labs for lipids    Return in about 6 months (around 3/20/2022). The most recent results of the following tests were reviewed with the patient today: none     All copied or forwarded information in the progress note was verified by me to be accurate at the time of visit  Patient's past medical, surgical, social and family history were reviewed and updated     All patient questions answered. Patient voiced understanding.

## 2021-09-30 ENCOUNTER — HOSPITAL ENCOUNTER (OUTPATIENT)
Dept: NON INVASIVE DIAGNOSTICS | Age: 66
Discharge: HOME | End: 2021-09-30
Payer: COMMERCIAL

## 2021-09-30 DIAGNOSIS — R60.0 LOWER EXTREMITY EDEMA: ICD-10-CM

## 2021-09-30 LAB
LV EF: 65 %
LVEF MODALITY: NORMAL

## 2021-09-30 PROCEDURE — 93306 TTE W/DOPPLER COMPLETE: CPT

## 2021-10-04 DIAGNOSIS — I51.89 DIASTOLIC DYSFUNCTION: Primary | ICD-10-CM

## 2021-10-11 ENCOUNTER — OFFICE VISIT (OUTPATIENT)
Dept: CARDIOLOGY CLINIC | Age: 66
End: 2021-10-11
Payer: COMMERCIAL

## 2021-10-11 VITALS
WEIGHT: 230.4 LBS | HEIGHT: 70 IN | DIASTOLIC BLOOD PRESSURE: 78 MMHG | HEART RATE: 80 BPM | BODY MASS INDEX: 32.99 KG/M2 | SYSTOLIC BLOOD PRESSURE: 142 MMHG

## 2021-10-11 DIAGNOSIS — R60.0 LEG EDEMA: ICD-10-CM

## 2021-10-11 DIAGNOSIS — R06.02 SOB (SHORTNESS OF BREATH): Primary | ICD-10-CM

## 2021-10-11 PROCEDURE — 93000 ELECTROCARDIOGRAM COMPLETE: CPT | Performed by: NUCLEAR MEDICINE

## 2021-10-11 PROCEDURE — 99204 OFFICE O/P NEW MOD 45 MIN: CPT | Performed by: NUCLEAR MEDICINE

## 2021-10-11 ASSESSMENT — ENCOUNTER SYMPTOMS
RECTAL PAIN: 0
DIARRHEA: 0
CHEST TIGHTNESS: 0
SHORTNESS OF BREATH: 1
VOMITING: 0
PHOTOPHOBIA: 0
CONSTIPATION: 0
COLOR CHANGE: 0
ABDOMINAL DISTENTION: 0
BACK PAIN: 0
ABDOMINAL PAIN: 0
ANAL BLEEDING: 0
NAUSEA: 0
BLOOD IN STOOL: 0

## 2021-10-11 NOTE — PROGRESS NOTES
[id number] [facility] [employer] .  [address] [place] [id number]  Dept: [phone]  Dept Fax: [phone]  Loc: [phone]    Visit Date: 10/11/2021    Augusta Goldberg is a 61 y.o. male who presents todayfor:  Chief Complaint   Patient presents with    New Patient    Leg Swelling     Here for the first time  Started with leg edema  Some dyspnea at times  Exertional  No chest pain per se  Some atypical chest pain   Not exertional   No know CAD  Borderline BP   No meds  Known hyperlipidemia  No statins for that   Does have signs of sleep apnea  No CPAP yet   No smoking   Family history of CAD     HPI:  HPI  Past Medical History:   Diagnosis Date    GERD (gastroesophageal reflux disease)     BRENDA on CPAP     Other hyperlipidemia       Past Surgical History:   Procedure Laterality Date    APPENDECTOMY  1983??    COLONOSCOPY  3-2016    CYSTOSCOPY Left 7/12/15    Left Ureteroscopy, Pyelogram, Left Ureteral Dilation, Stent Insertion - Dr. Adryan Storm  10/04/2016    Robotic Right Inguinal Hernia Repair, Umbilical Hernia Repair - Dr. Lozano Baron    LITHOTRIPSY Left 3-18-16    LT ESWL    TONSILLECTOMY      as a child     Family History   Problem Relation Age of Onset    Sleep Apnea Father      Social History     Tobacco Use    Smoking status: Never Smoker    Smokeless tobacco: Never Used   Substance Use Topics    Alcohol use: Yes     Comment: social      Current Outpatient Medications   Medication Sig Dispense Refill    furosemide (LASIX) 20 MG tablet Take 1 tablet by mouth daily 30 tablet 5    omeprazole (PRILOSEC) 20 MG delayed release capsule Take 20 mg by mouth daily      calcium carbonate (TUMS) 500 MG chewable tablet Take 1 tablet by mouth daily as needed for Heartburn       No current facility-administered medications for this visit.      No Known Allergies  Health Maintenance   Topic Date Due    Hepatitis C screen  Never done    COVID-19 Vaccine (1) Never done    HIV screen  Never done    DTaP/Tdap/Td vaccine (1 - Tdap) Never done    Shingles Vaccine (1 of 2) Never done    Potassium monitoring  02/12/2021    Creatinine monitoring  02/12/2021    Flu vaccine (1) Never done    Lipid screen  09/17/2025    Colon cancer screen colonoscopy  06/03/2031    Hepatitis A vaccine  Aged Out    Hepatitis B vaccine  Aged Out    Hib vaccine  Aged Out    Meningococcal (ACWY) vaccine  Aged Out    Pneumococcal 0-64 years Vaccine  Aged Out       Subjective:  Review of Systems   Constitutional: Positive for fatigue. HENT: Negative for ear pain and hearing loss. Eyes: Negative for photophobia. Respiratory: Positive for shortness of breath. Negative for chest tightness. Cardiovascular: Negative for chest pain, palpitations and leg swelling. Gastrointestinal: Negative for abdominal distention, abdominal pain, anal bleeding, blood in stool, constipation, diarrhea, nausea, rectal pain and vomiting. Endocrine: Negative for polyphagia. Genitourinary: Negative for dysuria, frequency and urgency. Musculoskeletal: Negative for arthralgias, back pain, gait problem, joint swelling, myalgias, neck pain and neck stiffness. Skin: Negative for color change, pallor, rash and wound. Neurological: Negative for dizziness, syncope and light-headedness. Psychiatric/Behavioral: Negative for confusion, decreased concentration, dysphoric mood, hallucinations, self-injury and sleep disturbance. The patient is not nervous/anxious and is not hyperactive. Objective:  Physical Exam  HENT:      Right Ear: Tympanic membrane normal.      Nose: Nose normal.      Mouth/Throat:      Mouth: Mucous membranes are moist.   Eyes:      Pupils: Pupils are equal, round, and reactive to light. Cardiovascular:      Rate and Rhythm: Normal rate and regular rhythm. Heart sounds: Murmur heard. Pulmonary:      Effort: No respiratory distress.       Breath sounds: No stridor. No wheezing, rhonchi or rales. Chest:      Chest wall: No tenderness. Abdominal:      General: There is no distension. Palpations: There is no mass. Tenderness: There is no abdominal tenderness. There is no right CVA tenderness, left CVA tenderness, guarding or rebound. Hernia: No hernia is present. Musculoskeletal:         General: No swelling, tenderness, deformity or signs of injury. Cervical back: Normal range of motion. Right lower leg: No edema. Left lower leg: No edema. Skin:     Coloration: Skin is not jaundiced or pale. Findings: No bruising, erythema, lesion or rash. Neurological:      Mental Status: He is alert and oriented to person, place, and time. Cranial Nerves: No cranial nerve deficit. Sensory: No sensory deficit. Motor: No weakness. Coordination: Coordination normal.      Gait: Gait normal.      Deep Tendon Reflexes: Reflexes normal.   Psychiatric:         Mood and Affect: Mood normal.         Thought Content: Thought content normal.       BP (!) 142/78   Pulse 80   Ht 5' 10\" (1.778 m)   Wt 230 lb 6.4 oz (104.5 kg)   BMI 33.06 kg/m²     Assessment:      Diagnosis Orders   1. SOB (shortness of breath)  EKG 12 lead   2. Leg edema     leg edema  Likely due to sleep apnea  diastolic dysfunction   Risk for CAD  Borderline symptoms  ECG in office was done today. I reviewed the ECG. No acute findings    Plan:  No follow-ups on file. Discussed  Life style changes  Manage cholesterol   Consider a stress test   Continue risk factor modification and medical management  Thank you for allowing me to participate in the care of your patient. Please don't hesitate to contact me regarding any further issues related to the patient care    Orders Placed:  Orders Placed This Encounter   Procedures    EKG 12 lead     Order Specific Question:   Reason for Exam?     Answer:    Other       Medications Prescribed:  No orders of the defined types were placed in this encounter. Discussed use, benefit, and side effects of prescribed medications. All patient questions answered. Pt voicedunderstanding. Instructed to continue current medications, diet and exercise. Continue risk factor modification and medical management. Patient agreed with treatment plan. Follow up as directed.     Electronically signedby Sangeetha Antunez MD on 10/11/2021 at 11:32 AM

## 2021-10-12 ENCOUNTER — HOSPITAL ENCOUNTER (OUTPATIENT)
Dept: NON INVASIVE DIAGNOSTICS | Age: 66
Discharge: HOME | End: 2021-10-12
Payer: COMMERCIAL

## 2021-10-12 DIAGNOSIS — R06.02 SOB (SHORTNESS OF BREATH): ICD-10-CM

## 2021-10-12 DIAGNOSIS — R60.0 LEG EDEMA: ICD-10-CM

## 2021-10-12 LAB
LV EF: 71 %
LVEF MODALITY: NORMAL

## 2021-10-12 PROCEDURE — 78452 HT MUSCLE IMAGE SPECT MULT: CPT

## 2021-10-12 PROCEDURE — 3430000000 HC RX DIAGNOSTIC RADIOPHARMACEUTICAL: Performed by: NUCLEAR MEDICINE

## 2021-10-12 PROCEDURE — 93017 CV STRESS TEST TRACING ONLY: CPT | Performed by: NUCLEAR MEDICINE

## 2021-10-12 PROCEDURE — A9500 TC99M SESTAMIBI: HCPCS | Performed by: NUCLEAR MEDICINE

## 2021-10-12 RX ADMIN — Medication 29.5 MILLICURIE: at 08:45

## 2021-10-12 RX ADMIN — Medication 8.8 MILLICURIE: at 07:13

## 2022-03-24 ENCOUNTER — OFFICE VISIT (OUTPATIENT)
Dept: FAMILY MEDICINE CLINIC | Age: 67
End: 2022-03-24
Payer: COMMERCIAL

## 2022-03-24 VITALS
RESPIRATION RATE: 16 BRPM | SYSTOLIC BLOOD PRESSURE: 120 MMHG | HEART RATE: 73 BPM | HEIGHT: 70 IN | TEMPERATURE: 97.2 F | WEIGHT: 236.2 LBS | DIASTOLIC BLOOD PRESSURE: 82 MMHG | OXYGEN SATURATION: 97 % | BODY MASS INDEX: 33.82 KG/M2

## 2022-03-24 DIAGNOSIS — R60.9 DEPENDENT EDEMA: ICD-10-CM

## 2022-03-24 DIAGNOSIS — K21.9 GASTROESOPHAGEAL REFLUX DISEASE WITHOUT ESOPHAGITIS: ICD-10-CM

## 2022-03-24 DIAGNOSIS — E78.5 DYSLIPIDEMIA: ICD-10-CM

## 2022-03-24 PROCEDURE — 99214 OFFICE O/P EST MOD 30 MIN: CPT | Performed by: FAMILY MEDICINE

## 2022-03-24 RX ORDER — MINOCYCLINE HYDROCHLORIDE 100 MG/1
100 CAPSULE ORAL 2 TIMES DAILY
COMMUNITY
End: 2022-09-29

## 2022-03-24 RX ORDER — IVERMECTIN 10 MG/G
CREAM TOPICAL
COMMUNITY
End: 2022-09-29

## 2022-03-24 ASSESSMENT — PATIENT HEALTH QUESTIONNAIRE - PHQ9
SUM OF ALL RESPONSES TO PHQ9 QUESTIONS 1 & 2: 0
SUM OF ALL RESPONSES TO PHQ QUESTIONS 1-9: 0
1. LITTLE INTEREST OR PLEASURE IN DOING THINGS: 0
2. FEELING DOWN, DEPRESSED OR HOPELESS: 0
SUM OF ALL RESPONSES TO PHQ QUESTIONS 1-9: 0

## 2022-03-24 NOTE — PROGRESS NOTES
Kwame Mitchell is a 61 y.o. male that presents for     Chief Complaint   Patient presents with    6 Month Follow-Up       /82   Pulse 73   Temp 97.2 °F (36.2 °C) (Infrared)   Resp 16   Ht 5' 10\" (1.778 m)   Wt 236 lb 3.2 oz (107.1 kg)   SpO2 97%   BMI 33.89 kg/m²       HPI    LE Edema    HPI:    Kwame Mitchell is complaining of swelling in both legs. Started on Lasix at last visit. Does feel like this has helped. Symptoms have been present for 3 year(s). Inciting event? No   Weight change associated with swelling? No    Erythema? No  Tenderness? No  Shortness of breath or chest pain? No  Orthopnea? No     Hx of renal, thyroid or liver disease? No  Hx of CHF? No  Hx of thromboembolic diease? No  Medications associated with fluid retention? No    GERD:  'It's been good if I stay away from certain foods', taking omeprazole PRN    Dyslipidemia    Current Medication regimen - none  Personal History of CAD? No   Hx of CAD in first degree relatives? No  Current smoker? No  History of HTN? No  History of DM? No    Shortness of breath or chest pain? No  Neurologic changes? No  Extremity edema?  Yes    Lab Results   Component Value Date    LDLCALC 159 09/17/2020     BP Readings from Last 3 Encounters:   03/24/22 120/82   10/11/21 (!) 142/78   09/20/21 128/84     Wt Readings from Last 3 Encounters:   03/24/22 236 lb 3.2 oz (107.1 kg)   10/11/21 230 lb 6.4 oz (104.5 kg)   09/20/21 233 lb 9.6 oz (106 kg)         Health Maintenance   Topic Date Due    Hepatitis C screen  Never done    COVID-19 Vaccine (1) Never done    HIV screen  Never done    DTaP/Tdap/Td vaccine (1 - Tdap) Never done    Shingles Vaccine (1 of 2) Never done    Potassium monitoring  02/12/2021    Creatinine monitoring  02/12/2021    Flu vaccine (1) Never done    Depression Screen  03/18/2022    Lipid screen  09/17/2025    Colorectal Cancer Screen  06/03/2031    Hepatitis A vaccine  Aged Out    Hepatitis B vaccine Aged Out    Hib vaccine  Aged Out    Meningococcal (ACWY) vaccine  Aged Out    Pneumococcal 0-64 years Vaccine  Aged Out       Past Medical History:   Diagnosis Date    GERD (gastroesophageal reflux disease)     BRENDA on CPAP     Other hyperlipidemia        Past Surgical History:   Procedure Laterality Date    APPENDECTOMY  1983??    COLONOSCOPY  3-2016    CYSTOSCOPY Left 7/12/15    Left Ureteroscopy, Pyelogram, Left Ureteral Dilation, Stent Insertion - Dr. Kayden Gamez  10/04/2016    Robotic Right Inguinal Hernia Repair, Umbilical Hernia Repair - Dr. Marlon Dela Cruz    LITHOTRIPSY Left 3-18-16    LT ESWL    TONSILLECTOMY      as a child       Social History     Tobacco Use    Smoking status: Never Smoker    Smokeless tobacco: Never Used   Substance Use Topics    Alcohol use: Yes     Comment: social    Drug use: No       Family History   Problem Relation Age of Onset    Sleep Apnea Father          I have reviewed the patient's past medical history, past surgical history, allergies, medications, social and family history and I have made updates where appropriate. Review of Systems        PHYSICAL EXAM:  /82   Pulse 73   Temp 97.2 °F (36.2 °C) (Infrared)   Resp 16   Ht 5' 10\" (1.778 m)   Wt 236 lb 3.2 oz (107.1 kg)   SpO2 97%   BMI 33.89 kg/m²     Physical Exam  Vitals and nursing note reviewed. Constitutional:       General: He is not in acute distress. Appearance: He is well-developed. HENT:      Head: Normocephalic and atraumatic. Right Ear: Hearing and external ear normal.      Left Ear: Hearing and external ear normal.      Nose: Nose normal.   Eyes:      General:         Right eye: No discharge. Left eye: No discharge. Conjunctiva/sclera: Conjunctivae normal.   Neck:      Thyroid: No thyromegaly. Trachea: No tracheal deviation. Cardiovascular:      Rate and Rhythm: Normal rate and regular rhythm. Heart sounds: Normal heart sounds.  No murmur heard. No friction rub. No gallop. Pulmonary:      Effort: Pulmonary effort is normal. No respiratory distress. Breath sounds: No wheezing or rales. Chest:      Chest wall: No tenderness. Musculoskeletal:         General: No deformity. Cervical back: Normal range of motion and neck supple. Right lower leg: Edema present. Left lower leg: Edema present. Lymphadenopathy:      Cervical: No cervical adenopathy. Skin:     General: Skin is warm and dry. Findings: No erythema or rash. Neurological:      Mental Status: He is alert. Motor: No abnormal muscle tone. Coordination: Coordination normal.   Psychiatric:         Behavior: Behavior normal.         Thought Content: Thought content normal.         Judgment: Judgment normal.             ASSESSMENT & PLAN  Priyank Blum was seen today for 6 month follow-up. Diagnoses and all orders for this visit:    Gastroesophageal reflux disease without esophagitis    Dependent edema    Dyslipidemia       -Continue Lasix, obtain labs  -Other chronic issues are stable, continue current medications  -Advised to call if any issues        Return in about 6 months (around 9/24/2022). The most recent results of the following tests were reviewed with the patient today: none     All copied or forwarded information in the progress note was verified by me to be accurate at the time of visit  Patient's past medical, surgical, social and family history were reviewed and updated     All patient questions answered. Patient voiced understanding.

## 2022-09-29 ENCOUNTER — OFFICE VISIT (OUTPATIENT)
Dept: FAMILY MEDICINE CLINIC | Age: 67
End: 2022-09-29
Payer: MEDICARE

## 2022-09-29 VITALS
RESPIRATION RATE: 16 BRPM | HEIGHT: 70 IN | DIASTOLIC BLOOD PRESSURE: 84 MMHG | OXYGEN SATURATION: 94 % | BODY MASS INDEX: 31.81 KG/M2 | WEIGHT: 222.2 LBS | TEMPERATURE: 97.8 F | SYSTOLIC BLOOD PRESSURE: 132 MMHG | HEART RATE: 81 BPM

## 2022-09-29 DIAGNOSIS — Z23 NEED FOR SHINGLES VACCINE: ICD-10-CM

## 2022-09-29 DIAGNOSIS — K21.9 GASTROESOPHAGEAL REFLUX DISEASE WITHOUT ESOPHAGITIS: Primary | ICD-10-CM

## 2022-09-29 DIAGNOSIS — B02.9 HERPES ZOSTER WITHOUT COMPLICATION: ICD-10-CM

## 2022-09-29 DIAGNOSIS — R60.9 DEPENDENT EDEMA: ICD-10-CM

## 2022-09-29 PROCEDURE — G8427 DOCREV CUR MEDS BY ELIG CLIN: HCPCS | Performed by: FAMILY MEDICINE

## 2022-09-29 PROCEDURE — 99214 OFFICE O/P EST MOD 30 MIN: CPT | Performed by: FAMILY MEDICINE

## 2022-09-29 PROCEDURE — 90677 PCV20 VACCINE IM: CPT | Performed by: FAMILY MEDICINE

## 2022-09-29 PROCEDURE — 1123F ACP DISCUSS/DSCN MKR DOCD: CPT | Performed by: FAMILY MEDICINE

## 2022-09-29 PROCEDURE — G8417 CALC BMI ABV UP PARAM F/U: HCPCS | Performed by: FAMILY MEDICINE

## 2022-09-29 PROCEDURE — 1036F TOBACCO NON-USER: CPT | Performed by: FAMILY MEDICINE

## 2022-09-29 PROCEDURE — 3017F COLORECTAL CA SCREEN DOC REV: CPT | Performed by: FAMILY MEDICINE

## 2022-09-29 PROCEDURE — G0009 ADMIN PNEUMOCOCCAL VACCINE: HCPCS | Performed by: FAMILY MEDICINE

## 2022-09-29 SDOH — ECONOMIC STABILITY: FOOD INSECURITY: WITHIN THE PAST 12 MONTHS, THE FOOD YOU BOUGHT JUST DIDN'T LAST AND YOU DIDN'T HAVE MONEY TO GET MORE.: NEVER TRUE

## 2022-09-29 SDOH — ECONOMIC STABILITY: FOOD INSECURITY: WITHIN THE PAST 12 MONTHS, YOU WORRIED THAT YOUR FOOD WOULD RUN OUT BEFORE YOU GOT MONEY TO BUY MORE.: NEVER TRUE

## 2022-09-29 ASSESSMENT — SOCIAL DETERMINANTS OF HEALTH (SDOH): HOW HARD IS IT FOR YOU TO PAY FOR THE VERY BASICS LIKE FOOD, HOUSING, MEDICAL CARE, AND HEATING?: NOT HARD AT ALL

## 2022-09-29 NOTE — PROGRESS NOTES
Alyx Gomez is a 74 y.o. male that presents for     Chief Complaint   Patient presents with    6 Month Follow-Up    Rash     Back of left knee       /84   Pulse 81   Temp 97.8 °F (36.6 °C) (Infrared)   Resp 16   Ht 5' 10\" (1.778 m)   Wt 222 lb 3.2 oz (100.8 kg)   SpO2 94%   BMI 31.88 kg/m²       HPI    Weight is down overall. Has been eating less since retiring and is more physically active. Sleep 'comes and goes'. Notes that his energy is lower since retiring. Rash    HPI:    Length of time Sx have been present - intermittently for 20 years  Rash has gotten unchanged since initially starting  Affected areas - left leg  Inciting events or exposures prior to rash starting? No  Pruritic? No  Erythematous? Yes  Weeping or drainage? Yes - clusters   History of Urticaria? No  Fever? No  Painful? Yes    Review of Systems - General ROS: negative for - chills, fever or night sweats  Respiratory ROS: negative for - shortness of breath, stridor or wheezing      LE Edema    HPI:    Alyx Gomez is complaining of swelling in both legs. Stopped the lasix and has been doing well. Is more physically active. Symptoms have been present for 3 year(s). Inciting event? No   Weight change associated with swelling? No    Erythema? No  Tenderness? No  Shortness of breath or chest pain? No  Orthopnea? No     Hx of renal, thyroid or liver disease? No  Hx of CHF? No  Hx of thromboembolic diease? No  Medications associated with fluid retention? No    GERD:  'Depends on what I eart', taking omeprazole PRN    Dyslipidemia    Current Medication regimen - none  Personal History of CAD? No   Hx of CAD in first degree relatives? No  Current smoker? No  History of HTN? No  History of DM? No    Shortness of breath or chest pain? No  Neurologic changes? No  Extremity edema?  Yes    Lab Results   Component Value Date    LDLCALC 159 09/17/2020     BP Readings from Last 3 Encounters:   09/29/22 132/84 03/24/22 120/82   10/11/21 (!) 142/78     Wt Readings from Last 3 Encounters:   09/29/22 222 lb 3.2 oz (100.8 kg)   03/24/22 236 lb 3.2 oz (107.1 kg)   10/11/21 230 lb 6.4 oz (104.5 kg)         Health Maintenance   Topic Date Due    COVID-19 Vaccine (1) Never done    HIV screen  Never done    Hepatitis C screen  Never done    DTaP/Tdap/Td vaccine (1 - Tdap) Never done    Shingles vaccine (1 of 2) Never done    Flu vaccine (1) Never done    Annual Wellness Visit (AWV)  09/29/2022    Depression Screen  03/24/2023    Lipids  09/17/2025    Colorectal Cancer Screen  06/03/2031    Pneumococcal 65+ years Vaccine  Completed    Hepatitis A vaccine  Aged Out    Hepatitis B vaccine  Aged Out    Hib vaccine  Aged Out    Meningococcal (ACWY) vaccine  Aged Out       Past Medical History:   Diagnosis Date    GERD (gastroesophageal reflux disease)     BRENDA on CPAP     Other hyperlipidemia        Past Surgical History:   Procedure Laterality Date    APPENDECTOMY  1983??    COLONOSCOPY  3-2016    CYSTOSCOPY Left 7/12/15    Left Ureteroscopy, Pyelogram, Left Ureteral Dilation, Stent Insertion - Dr. Furman Goodell  10/04/2016    Robotic Right Inguinal Hernia Repair, Umbilical Hernia Repair - Dr. Melyssa Deras    LITHOTRIPSY Left 3-18-16    LT ESWL    TONSILLECTOMY      as a child       Social History     Tobacco Use    Smoking status: Never    Smokeless tobacco: Never   Substance Use Topics    Alcohol use: Yes     Comment: social    Drug use: No       Family History   Problem Relation Age of Onset    Sleep Apnea Father          I have reviewed the patient's past medical history, past surgical history, allergies, medications, social and family history and I have made updates where appropriate.     Review of Systems        PHYSICAL EXAM:  /84   Pulse 81   Temp 97.8 °F (36.6 °C) (Infrared)   Resp 16   Ht 5' 10\" (1.778 m)   Wt 222 lb 3.2 oz (100.8 kg)   SpO2 94%   BMI 31.88 kg/m²     Physical Exam  Vitals and nursing note reviewed. Constitutional:       General: He is not in acute distress. Appearance: He is well-developed. HENT:      Head: Normocephalic and atraumatic. Right Ear: Hearing and external ear normal.      Left Ear: Hearing and external ear normal.      Nose: Nose normal.   Eyes:      General:         Right eye: No discharge. Left eye: No discharge. Conjunctiva/sclera: Conjunctivae normal.   Neck:      Thyroid: No thyromegaly. Trachea: No tracheal deviation. Cardiovascular:      Rate and Rhythm: Normal rate and regular rhythm. Heart sounds: Normal heart sounds. No murmur heard. No friction rub. No gallop. Pulmonary:      Effort: Pulmonary effort is normal. No respiratory distress. Breath sounds: No wheezing or rales. Chest:      Chest wall: No tenderness. Musculoskeletal:         General: No deformity. Cervical back: Normal range of motion and neck supple. Right lower leg: Edema present. Left lower leg: Edema present. Lymphadenopathy:      Cervical: No cervical adenopathy. Skin:     General: Skin is warm and dry. Findings: No erythema or rash. Neurological:      Mental Status: He is alert. Motor: No abnormal muscle tone. Coordination: Coordination normal.   Psychiatric:         Behavior: Behavior normal.         Thought Content: Thought content normal.         Judgment: Judgment normal.           ASSESSMENT & PLAN  Sanjana Molina was seen today for 6 month follow-up and rash.     Diagnoses and all orders for this visit:    Gastroesophageal reflux disease without esophagitis    Need for shingles vaccine  -     Pneumococcal, PCV20, PREVNAR 20, (age 25 yrs+), IM, PF    Dependent edema    Herpes zoster without complication     -Change lasix to prn  -continue omeprazole for GERD  -Wants to monitor shingles for now, will let me know if he wants to start acyclovir PRN  -Other chronic issues are stable, continue current medications  -Advised to call if any issues        Return in about 1 year (around 9/29/2023). The most recent results of the following tests were reviewed with the patient today: none     All copied or forwarded information in the progress note was verified by me to be accurate at the time of visit  Patient's past medical, surgical, social and family history were reviewed and updated     All patient questions answered. Patient voiced understanding.

## 2022-10-11 ENCOUNTER — OFFICE VISIT (OUTPATIENT)
Dept: CARDIOLOGY CLINIC | Age: 67
End: 2022-10-11
Payer: MEDICARE

## 2022-10-11 VITALS
WEIGHT: 223 LBS | DIASTOLIC BLOOD PRESSURE: 74 MMHG | HEART RATE: 70 BPM | HEIGHT: 70 IN | BODY MASS INDEX: 31.92 KG/M2 | SYSTOLIC BLOOD PRESSURE: 125 MMHG

## 2022-10-11 DIAGNOSIS — R06.02 SOB (SHORTNESS OF BREATH): Primary | ICD-10-CM

## 2022-10-11 PROCEDURE — G8427 DOCREV CUR MEDS BY ELIG CLIN: HCPCS | Performed by: NUCLEAR MEDICINE

## 2022-10-11 PROCEDURE — G8484 FLU IMMUNIZE NO ADMIN: HCPCS | Performed by: NUCLEAR MEDICINE

## 2022-10-11 PROCEDURE — G8417 CALC BMI ABV UP PARAM F/U: HCPCS | Performed by: NUCLEAR MEDICINE

## 2022-10-11 PROCEDURE — 3017F COLORECTAL CA SCREEN DOC REV: CPT | Performed by: NUCLEAR MEDICINE

## 2022-10-11 PROCEDURE — 99213 OFFICE O/P EST LOW 20 MIN: CPT | Performed by: NUCLEAR MEDICINE

## 2022-10-11 PROCEDURE — 1036F TOBACCO NON-USER: CPT | Performed by: NUCLEAR MEDICINE

## 2022-10-11 PROCEDURE — 93000 ELECTROCARDIOGRAM COMPLETE: CPT | Performed by: NUCLEAR MEDICINE

## 2022-10-11 PROCEDURE — 1123F ACP DISCUSS/DSCN MKR DOCD: CPT | Performed by: NUCLEAR MEDICINE

## 2022-10-11 RX ORDER — FUROSEMIDE 20 MG/1
20 TABLET ORAL DAILY PRN
COMMUNITY

## 2022-10-11 RX ORDER — MINOCYCLINE HYDROCHLORIDE 100 MG/1
100 CAPSULE ORAL
COMMUNITY
End: 2022-10-11 | Stop reason: ALTCHOICE

## 2022-10-11 NOTE — PROGRESS NOTES
[facility]  [address] [address]  Dept: [phone]  Dept Fax: [phone]  Loc: [phone]    Visit Date: 10/11/2022    Maryjane Alvarez is a 74 y.o. male who presents todayfor:  Chief Complaint   Patient presents with    Check-Up    Shortness of Breath     Has sleep apnea  No CPAP   Leg edema is okay   Stress test was okay   No known CAD   Active patient   Some baseline dyspnea      HPI:  HPI  Past Medical History:   Diagnosis Date    GERD (gastroesophageal reflux disease)     BRENDA on CPAP     Other hyperlipidemia       Past Surgical History:   Procedure Laterality Date    APPENDECTOMY  1983??    COLONOSCOPY  3-2016    CYSTOSCOPY Left 7/12/15    Left Ureteroscopy, Pyelogram, Left Ureteral Dilation, Stent Insertion - Dr. Lorrane Spurling  10/04/2016    Robotic Right Inguinal Hernia Repair, Umbilical Hernia Repair - Dr. Vanesa San    LITHOTRIPSY Left 3-18-16    LT ESWL    TONSILLECTOMY      as a child     Family History   Problem Relation Age of Onset    Sleep Apnea Father      Social History     Tobacco Use    Smoking status: Never    Smokeless tobacco: Never   Substance Use Topics    Alcohol use: Yes     Comment: social      Current Outpatient Medications   Medication Sig Dispense Refill    furosemide (LASIX) 20 MG tablet Take 20 mg by mouth daily as needed      omeprazole (PRILOSEC) 20 MG delayed release capsule Take 20 mg by mouth daily      calcium carbonate (TUMS) 500 MG chewable tablet Take 1 tablet by mouth daily as needed for Heartburn       No current facility-administered medications for this visit.      No Known Allergies  Health Maintenance   Topic Date Due    COVID-19 Vaccine (1) Never done    HIV screen  Never done    Hepatitis C screen  Never done    DTaP/Tdap/Td vaccine (1 - Tdap) Never done    Shingles vaccine (1 of 2) Never done    Flu vaccine (1) Never done    Annual Wellness Visit (AWV)  09/29/2022    Depression Screen 03/24/2023    Lipids  09/17/2025    Colorectal Cancer Screen  06/03/2031    Pneumococcal 65+ years Vaccine  Completed    Hepatitis A vaccine  Aged Out    Hib vaccine  Aged Out    Meningococcal (ACWY) vaccine  Aged Out       Subjective:  Review of Systems  General:   No fever, no chills, No fatigue or weight loss  Pulmonary:    No dyspnea, no wheezing  Cardiac:    Denies recent chest pain,   GI:     No nausea or vomiting, no abdominal pain  Neuro:    No dizziness or light headedness,   Musculoskeletal:  No recent active issues  Extremities:   No edema, no obvious claudication     Objective:  Physical Exam  /74   Pulse 70   Ht 5' 10\" (1.778 m)   Wt 223 lb (101.2 kg)   BMI 32.00 kg/m²   General:   Well developed, well nourished  Lungs:   Clear to auscultation  Heart:    Normal S1 S2, Slight murmur. no rubs, no gallops  Abdomen:   Soft, non tender, no organomegalies, positive bowel sounds  Extremities:   No edema, no cyanosis, good peripheral pulses  Neurological:   Awake, alert, oriented. No obvious focal deficits  Musculoskelatal:  No obvious deformities    Assessment:      Diagnosis Orders   1. SOB (shortness of breath)  EKG 12 Lead      As above  Cardiac fair for now   ECG in office was done today. I reviewed the ECG. No acute findings    Plan:  No follow-ups on file. As above  Continue risk factor modification and medical management  Thank you for allowing me to participate in the care of your patient. Please don't hesitate to contact me regarding any further issues related to the patient care    Orders Placed:  Orders Placed This Encounter   Procedures    EKG 12 Lead     Order Specific Question:   Reason for Exam?     Answer: Other       Medications Prescribed:  No orders of the defined types were placed in this encounter. Discussed use, benefit, and side effects of prescribed medications. All patient questions answered. Pt voicedunderstanding.  Instructed to continue current medications, diet and exercise. Continue risk factor modification and medical management. Patient agreed with treatment plan. Follow up as directed.     Electronically signedby Mary Ramirez MD on 10/11/2022 at 8:21 AM

## 2022-10-27 ENCOUNTER — OFFICE VISIT (OUTPATIENT)
Dept: FAMILY MEDICINE CLINIC | Age: 67
End: 2022-10-27
Payer: MEDICARE

## 2022-10-27 ENCOUNTER — NURSE ONLY (OUTPATIENT)
Dept: LAB | Age: 67
End: 2022-10-27

## 2022-10-27 VITALS
BODY MASS INDEX: 32.38 KG/M2 | SYSTOLIC BLOOD PRESSURE: 122 MMHG | WEIGHT: 226.2 LBS | DIASTOLIC BLOOD PRESSURE: 68 MMHG | HEIGHT: 70 IN | HEART RATE: 70 BPM | OXYGEN SATURATION: 98 % | RESPIRATION RATE: 10 BRPM | TEMPERATURE: 97.7 F

## 2022-10-27 DIAGNOSIS — R35.1 NOCTURIA: ICD-10-CM

## 2022-10-27 DIAGNOSIS — E78.5 ELEVATED LIPIDS: ICD-10-CM

## 2022-10-27 DIAGNOSIS — R60.9 DEPENDENT EDEMA: ICD-10-CM

## 2022-10-27 DIAGNOSIS — K90.89 OTHER INTESTINAL MALABSORPTION: ICD-10-CM

## 2022-10-27 DIAGNOSIS — R10.12 LUQ PAIN: ICD-10-CM

## 2022-10-27 DIAGNOSIS — K40.90 RIGHT INGUINAL HERNIA: ICD-10-CM

## 2022-10-27 DIAGNOSIS — R73.9 BLOOD GLUCOSE ELEVATED: ICD-10-CM

## 2022-10-27 DIAGNOSIS — N20.0 NEPHROLITHIASIS: ICD-10-CM

## 2022-10-27 DIAGNOSIS — K21.9 GASTROESOPHAGEAL REFLUX DISEASE WITHOUT ESOPHAGITIS: Primary | ICD-10-CM

## 2022-10-27 DIAGNOSIS — N40.0 BENIGN PROSTATIC HYPERPLASIA WITHOUT LOWER URINARY TRACT SYMPTOMS: ICD-10-CM

## 2022-10-27 DIAGNOSIS — G47.9 SLEEP DIFFICULTIES: ICD-10-CM

## 2022-10-27 DIAGNOSIS — I51.89 DIASTOLIC DYSFUNCTION: ICD-10-CM

## 2022-10-27 DIAGNOSIS — K90.89 OTHER SPECIFIED INTESTINAL MALABSORPTION: ICD-10-CM

## 2022-10-27 DIAGNOSIS — R53.83 TIRED: ICD-10-CM

## 2022-10-27 DIAGNOSIS — K21.9 GASTROESOPHAGEAL REFLUX DISEASE WITHOUT ESOPHAGITIS: ICD-10-CM

## 2022-10-27 DIAGNOSIS — R14.0 BLOATING: ICD-10-CM

## 2022-10-27 DIAGNOSIS — M26.629 TMJ SYNDROME: ICD-10-CM

## 2022-10-27 DIAGNOSIS — R60.0 LOWER EXTREMITY EDEMA: ICD-10-CM

## 2022-10-27 DIAGNOSIS — E78.5 DYSLIPIDEMIA: ICD-10-CM

## 2022-10-27 LAB
ALBUMIN SERPL-MCNC: 4.7 G/DL (ref 3.5–5.1)
ALP BLD-CCNC: 97 U/L (ref 38–126)
ALT SERPL-CCNC: 42 U/L (ref 11–66)
ANION GAP SERPL CALCULATED.3IONS-SCNC: 11 MEQ/L (ref 8–16)
AST SERPL-CCNC: 24 U/L (ref 5–40)
AVERAGE GLUCOSE: 126 MG/DL (ref 70–126)
BASOPHILS # BLD: 0.6 %
BASOPHILS ABSOLUTE: 0 THOU/MM3 (ref 0–0.1)
BILIRUB SERPL-MCNC: 0.4 MG/DL (ref 0.3–1.2)
BILIRUBIN DIRECT: < 0.2 MG/DL (ref 0–0.3)
BUN BLDV-MCNC: 16 MG/DL (ref 7–22)
CALCIUM SERPL-MCNC: 10 MG/DL (ref 8.5–10.5)
CHLORIDE BLD-SCNC: 103 MEQ/L (ref 98–111)
CHOLESTEROL, TOTAL: 284 MG/DL (ref 100–199)
CO2: 27 MEQ/L (ref 23–33)
CREAT SERPL-MCNC: 0.8 MG/DL (ref 0.4–1.2)
EOSINOPHIL # BLD: 1.5 %
EOSINOPHILS ABSOLUTE: 0.1 THOU/MM3 (ref 0–0.4)
ERYTHROCYTE [DISTWIDTH] IN BLOOD BY AUTOMATED COUNT: 13.2 % (ref 11.5–14.5)
ERYTHROCYTE [DISTWIDTH] IN BLOOD BY AUTOMATED COUNT: 44.1 FL (ref 35–45)
FOLATE: 11.7 NG/ML (ref 4.8–24.2)
GFR SERPL CREATININE-BSD FRML MDRD: > 60 ML/MIN/1.73M2
GLUCOSE BLD-MCNC: 102 MG/DL (ref 70–108)
HBA1C MFR BLD: 6.2 % (ref 4.4–6.4)
HCT VFR BLD CALC: 50.2 % (ref 42–52)
HDLC SERPL-MCNC: 41 MG/DL
HEMOGLOBIN: 17.1 GM/DL (ref 14–18)
IMMATURE GRANS (ABS): 0.03 THOU/MM3 (ref 0–0.07)
IMMATURE GRANULOCYTES: 0.5 %
LDL CHOLESTEROL CALCULATED: 210 MG/DL
LIPASE: 18.2 U/L (ref 5.6–51.3)
LYMPHOCYTES # BLD: 27.4 %
LYMPHOCYTES ABSOLUTE: 1.8 THOU/MM3 (ref 1–4.8)
MAGNESIUM: 2.4 MG/DL (ref 1.6–2.4)
MCH RBC QN AUTO: 31.2 PG (ref 26–33)
MCHC RBC AUTO-ENTMCNC: 34.1 GM/DL (ref 32.2–35.5)
MCV RBC AUTO: 91.6 FL (ref 80–94)
MONOCYTES # BLD: 9.6 %
MONOCYTES ABSOLUTE: 0.6 THOU/MM3 (ref 0.4–1.3)
NUCLEATED RED BLOOD CELLS: 0 /100 WBC
PLATELET # BLD: 285 THOU/MM3 (ref 130–400)
PMV BLD AUTO: 10.1 FL (ref 9.4–12.4)
POTASSIUM SERPL-SCNC: 4.7 MEQ/L (ref 3.5–5.2)
PROSTATE SPECIFIC ANTIGEN: 0.66 NG/ML (ref 0–1)
PTH INTACT: 25.1 PG/ML (ref 15–65)
RBC # BLD: 5.48 MILL/MM3 (ref 4.7–6.1)
RHEUMATOID FACTOR: < 10 IU/ML (ref 0–13)
SEDIMENTATION RATE, ERYTHROCYTE: 5 MM/HR (ref 0–10)
SEG NEUTROPHILS: 60.4 %
SEGMENTED NEUTROPHILS ABSOLUTE COUNT: 3.9 THOU/MM3 (ref 1.8–7.7)
SODIUM BLD-SCNC: 141 MEQ/L (ref 135–145)
T4 FREE: 0.99 NG/DL (ref 0.93–1.76)
TOTAL PROTEIN: 7.1 G/DL (ref 6.1–8)
TRIGL SERPL-MCNC: 165 MG/DL (ref 0–199)
TSH SERPL DL<=0.05 MIU/L-ACNC: 2.19 UIU/ML (ref 0.4–4.2)
URIC ACID: 6.2 MG/DL (ref 3.7–7)
VITAMIN B-12: 329 PG/ML (ref 211–911)
VITAMIN D 25-HYDROXY: 40 NG/ML (ref 30–100)
WBC # BLD: 6.5 THOU/MM3 (ref 4.8–10.8)

## 2022-10-27 PROCEDURE — G8417 CALC BMI ABV UP PARAM F/U: HCPCS | Performed by: FAMILY MEDICINE

## 2022-10-27 PROCEDURE — 99214 OFFICE O/P EST MOD 30 MIN: CPT | Performed by: FAMILY MEDICINE

## 2022-10-27 PROCEDURE — G8427 DOCREV CUR MEDS BY ELIG CLIN: HCPCS | Performed by: FAMILY MEDICINE

## 2022-10-27 PROCEDURE — G8484 FLU IMMUNIZE NO ADMIN: HCPCS | Performed by: FAMILY MEDICINE

## 2022-10-27 PROCEDURE — 1036F TOBACCO NON-USER: CPT | Performed by: FAMILY MEDICINE

## 2022-10-27 PROCEDURE — 1123F ACP DISCUSS/DSCN MKR DOCD: CPT | Performed by: FAMILY MEDICINE

## 2022-10-27 PROCEDURE — 3017F COLORECTAL CA SCREEN DOC REV: CPT | Performed by: FAMILY MEDICINE

## 2022-10-27 RX ORDER — TIZANIDINE 2 MG/1
TABLET ORAL PRN
COMMUNITY

## 2022-10-27 ASSESSMENT — ENCOUNTER SYMPTOMS
ALLERGIC/IMMUNOLOGIC NEGATIVE: 1
RESPIRATORY NEGATIVE: 1
ABDOMINAL DISTENTION: 1

## 2022-10-27 NOTE — PATIENT INSTRUCTIONS
Thank you   Thank you for trusting us with your healthcare needs. You may receive a survey regarding today's visit. It would help us out if you would take a few moments to provide your feedback. We value your input. Please bring in ALL medications BOTTLES, including inhalers, herbal supplements, over the counter, prescribed & non-prescribed medicine. The office would like actual medication bottles and a list.   Please note our OFFICE POLICIES:   Prior to getting your labs drawn, please check with your insurance company for benefits and eligibility of lab services. Often, insurance companies cover certain tests for preventative visits only. It is patient's responsibility to see what is covered. We are unable to change a diagnosis after the test has been performed. Lab orders will not be re-printed. Please hold onto your original lab orders and take them to your lab to be completed. If you no show your scheduled appointment three times, you will be dismissed from this practice. Reschedules must be completed 24 hours prior to your schedule appointment. If the list below has been completed, PLEASE FAX RECORDS TO OUR OFFICE @ [phone].  Once the records have been received we will update your records at our office:  Health Maintenance Due   Topic Date Due    COVID-19 Vaccine (1) Never done    HIV screen  Never done    Hepatitis C screen  Never done    DTaP/Tdap/Td vaccine (1 - Tdap) Never done    Shingles vaccine (1 of 2) Never done    Flu vaccine (1) Never done    Annual Wellness Visit (AWV)  09/29/2022

## 2022-10-27 NOTE — PROGRESS NOTES
for Heartburn, Disp: , Rfl:   Allergies: Patient has no known allergies. Immunizations:   Immunization History   Administered Date(s) Administered    Pneumococcal conjugate PCV20, PF (Prevnar 20) 09/29/2022        History of Present Illness:     Lalo had concerns including New Patient (Wee protocol), Fatigue (Low energy- better since he made the appt), Tinnitus (Off and on, getting worse- now having balance issues), Weight Loss (Wants to get below 200), Dizziness, and Sleep Problem (Toss and turn). Lenny Baca  presents to the [address] [place] [address] today for;   Chief Complaint   Patient presents with    New Patient     Wee protocol    Fatigue     Low energy- better since he made the appt    Tinnitus     Off and on, getting worse- now having balance issues    Weight Loss     Wants to get below 200    Dizziness    Sleep Problem     Toss and turn   , abnormal labs follow up and these conditions as he  Is looking today for:     No diagnosis found. HPI    Subjective:     Review of Systems   Constitutional:  Positive for fatigue and unexpected weight change. HENT:  Positive for sneezing and tinnitus. Eyes:  Positive for visual disturbance. Respiratory: Negative. Cardiovascular:  Positive for leg swelling. Gastrointestinal:  Positive for abdominal distention. Endocrine: Negative. Genitourinary: Negative. Musculoskeletal: Negative. Skin:         Nail splits   Allergic/Immunologic: Negative. Neurological:  Positive for light-headedness. Hematological: Negative. Psychiatric/Behavioral:  Positive for sleep disturbance. All other systems reviewed and are negative. Objective:     /68 (Site: Right Upper Arm, Position: Sitting, Cuff Size: Large Adult)   Pulse 70   Temp 97.7 °F (36.5 °C) (Temporal)   Resp 10   Ht 5' 10\" (1.778 m)   Wt 226 lb 3.2 oz (102.6 kg)   SpO2 98%   BMI 32.46 kg/m²   Physical Exam  Nursing note reviewed.    Constitutional:       Appearance: Normal appearance. HENT:      Head: Normocephalic. Pulmonary:      Effort: Pulmonary effort is normal.   Neurological:      Mental Status: He is alert. Psychiatric:         Mood and Affect: Mood normal.         Thought Content: Thought content normal.          Laboratory Data:   Lab results were searched in Care Everywhere and/or those brought by the pateint were reviewed today with Ankur More and he has a copy of their most recent labs to take home with them as noted below;       Imaging Data:   Imaging Data:       Assessment & Plan:       Impression:  No diagnosis found. Assessment and Plan:  After reviewing the patients chief complaints, reviewing their labfindings in great detail (with the patient and those accompanying them) which correlate to their chief complaints, symptoms, and or medical conditions; suggestions were made relating to changes in diet and or supplements which may improve the complaints and which will be reflected in their future lab findings; Chief Complaint   Patient presents with    New Patient     Wee protocol    Fatigue     Low energy- better since he made the appt    Tinnitus     Off and on, getting worse- now having balance issues    Weight Loss     Wants to get below 200    Dizziness    Sleep Problem     Toss and turn   ;    Plans for the next visits:  - Abnormal and non-optimal Labs were ordered today to be repeated in the next 120-365 days to assess changes from adjustments in nutrition and or nutrients.    - Patient instructed when having a blood draw to ask the  to divide their lab draws into multiple draws over several days if not feeling good at the time of the lab draw or if either prefers to do several smaller blood draws over several days  -Patient instructed to check with insurer before each lab draw and to go to the lab which the insurer directs them for the most cost effective lab draw with the least patient's cost  - Ankur More  will be scheduled subsequent to those results. Pao Delarosa will bring in his drink, food, supplement log to his next visit    Chronic Problems Addressed on this Visit:                                   1.  Intensity of Service; Uncontrolled items at this visit; Chief Complaint   Patient presents with    New Patient     Surinder protocol    Fatigue     Low energy- better since he made the appt    Tinnitus     Off and on, getting worse- now having balance issues    Weight Loss     Wants to get below 200    Dizziness    Sleep Problem     Toss and turn   ; Improved items at this visit and Stable items were discussed at this visit;  2. Patients food, drinks, supplements and symptoms were reviewed with the patient,       - Marbella Wright will bring food, drink, supplements and symptoms log to next visit for inclusion in their record      - 75 better food list reviewed & given to patient with the omega 6 food list to avoid      - The 52 Latex foods list was reviewed and given to the patients with the information on carrageenan         - Gluten in corn and oats abstracts sheet reviewed and given to the patient today   3. Greater than 31 minutes time was spent with the patient face to face on this visit; of which >50% was for counseling and coordination of care, as well as the time spent before and after the visit reviewing the chart, documenting the encounter, reviewing labs,reports, NIH listed studies, making phone calls, etc.      Patients food and drinks were reviewed with the patient,   - They will bring a food drink symptom log to future visits for inclusion in their record    - 75 better food list reviewed & given to patient along with the omega 6 food list to avoid      - Gluten in corn and oats abstracts sheet reviewed and given to the patient today    - 23 Foods containing Latex-like proteins was reviewed and copy to be taken if desired     - Nutrient Supplements list provided and copyto be taken if desired    - [employer]. [employer] web site offered to patient to review at their convenience by staff with login information    Note:  I have discussed with the patient that with all nutraceuticals, there is often mixed data and emerging research which needs to be monitored; as well as an array of NIH fact sheets on nutrients and supplements, available at www.nih,issue plus 2Catalyze. Aionex plus www.American HealthNeti,org. If I have recommended cinnamon at the request of this patient to assist them in control of their blood sugar, triglyceride, and/or weight issues. I discussed that the patient's clinical use of cinnamon bark, calcium, magnesium, Vitamin D, and pharmaceutical grade [employer] omega 3 oil or triple-strength fish oil, and B-50/B-100 time-released B-complex by [address] [facility] will be for a time-limited trial to determine their individual effectiveness and safety in this patient. I also referred the patient to the NMCD: Nutrition, Metabolism, and Cardiovascular Diseases (SecuritiesCard.pl) and concerns about long-term use and hepatotoxicity of cinnamon and other nutrients. I suggested they frequently search nih.gov for the latest non-proprietary information on nutriceuticals as well as consider a subscription to Eventmag.ru for details on reviewed supplements, or at the least review the nutrient files at [facility] at [facility], [address] bark, an insulin mimetic, reduces some High Carbohydrate Dietary Impacts. Methylhydroxychalcone polymers insulin-enhancing properties in fat cells are responsible for enhanced glucose uptake, inhibiting hepatic HMG-CoA reductase and lowers lipids. www.jacn. org/content/20/4/327.full     But cinnamon with additives such as Cinnamon Extract are not effective as insulin mimetics.  :eStoreDirectory.at     Nutrients for Start up from [facility] or [employer] for ease to get started now;  Luke Andrade has some useable products;  - Triple Strength Fish Oil, enteric coated  - Vit D-3 5000 IU gel caps  - Iron ferrous sulfate 325 mg tabs  - Centrum Silver look-a-like for most patients, or  - Centrum plain look-a-like if need iron    Local pharmacies or chains such as [employer], have:  - [employer]media pharmaceutical grade omega 3 is 90% EPA/DHA whereas most Triple strength fish oil are 75% EPA/DHA  - Triple Strength Fish Oil (enteric coated if available) or if not enteric coated, can take from freezer for less burps  - B-50 or B-100 released balanced B complex tabs by [address] [facility] bark 500 mg (without Chromium or extracts)   some brands list 1000 mg / serving of 2 capsules,    some brands have 1000 mg caps with the undesireable chromium extract  - Calcium carbonate/citrate, magnesium oxide/citrate, Vit D-3 as 3-4 tabs/caps/serving     Some Local Brands may contain Zinc which is acceptable for the first bottle or two  - Magnesium oxide 250 mg tabs for those having < 2 bowel movements daily  - Magnesium citrate 200 mg if having > 2 bowel movements/day  - Centrum Silver or look-a-like for most patients, Centrum plain or look-a-like with iron  - Vitamin D-3 comes as 1,000 IU or 2,000 IU or 5,000 IU gel caps or Liquid drops but keep Vitamin D levels <50 but >40     Some brands containing or derived from soy oil or corn oil are OK if not allergic to soy  - Elemental Iron 65 mg tabs at bedtime is available over the counter if need more iron     Usually turns bowel movements grey, green, or black but not a concern  - Apricot Kernel Oil (by Now) for dry skin sensitive perineal or perianal area skin    Nutrients for ongoing use by Mail order for less expense from www. Chat& (ChatAnd) ;  - Strength Fish Oil , 240 Softgels Item [id number]  -B-100 time released balanced B complex Item #[id number]  - Cinnamon bark 500 mg without Chromium or extract Item #[id number]  - Calcium carbonate 1000 mg, Magnesium oxide 500 mg, Vit D-3 400 IU Item #[id number]  - Magnesium oxide 500 mg tabs Item #[id number] if less than 2 bowel movements daily  - [employer] Seniors Item #[id number] for most patients, One Daily Item #262399 with iron  - Vit D 3  1,000 Item #[id number]      2,000 IU Item #[id number]   Item #644440     Some brands containing orderived from soy oil or corn oil are OK if not allergic to soy    Nutrients for Special Needs by Mail order for less expense from www. puritan.com;  -Elemental Iron 65 mg tabs Item #545608 if need more iron for low iron on labs    Usually turns bowel movements grey, green or black but not a concern  - Time released Niacin 250 mg Item #375768 for cold intolerance, low libido or impotence  - DHEA 50 mg Item #[id number] for improving DHEA levels on labs if having Fatigue    If stools too loose substitute for your Magnesium oxide using;   Magnesium citrate 200 mg tabs (NOT liquid) at [employer]   Magnesium gluconate 550 mg by [place] at [facility] or Kähu. com  Magnesium chloride foot soaks or body sprays  www.Vend-a-Bar   Magnesium chloride flakes 14.99 Item #: [id number] if back-ordered, get spray  Magnesium threonate, Magtein also helps mental clarity and sleep    Food Drink Symptom Log;  I asked this patient to track these items and any other symptoms on their list on a weekly basis to documenttheir progress or lack of same.  This can be done on the symptom tracking sheet I gave them at today's visit but looks like this:                                                      Rate on scale of 0-10 with zero = not noticeable  Symptom:                            Week 1               2                 3                 4               Etc            Hair loss    Foot cramps    Paresthesia    Aches    IBS (irritable bowel)    Constipation    Diarrhea  Nocturia (up to bathroom at night)    Fatigue/Energy level  Stress      On the other side of the sheet they can track their food, drink, environment, activity, symptoms etc      Avoiding Latex-like proteins in my foods; Avocados, Bananas, Celery, Figs & Kiwi proteins have latex-like proteins to inflame our immune systems, plus 47 more foods  How Can I Have A Latex Allergy? Eating foods with latex-like protein exposes us to latex allergies. Our body cannot tell the differencebetween these latex-like proteins and latex from rubber products since many people are allergic to fruit, vegetables and latex. Read labels on pre-packaged foods. This list to avoid is only a guide if you are known allergicto latex or have a latex rash on your chin, cheeks and lines on your neck and chest. The amount of latex is different in each food product or fruit variety. Avoid out of Season if not grown locally:   Melon, Nectarine, Papaya, Cherry, Passion fruit, Plum, Chestnuts, and Tomato. Avocado, Banana, Celery, Figs, and Kiwi always contain Latex-like protein. Whats in Season? Strawberries taste better in June than December because June is strawberry season so buy locally grown produce \"in season\" for the best flavor, cost, and less Latex. Locally grown produce not only tastes great but also requires little or no ethylene exposure in food distribution so has less latex content. Out of season: use canned, frozen, or dried since those are processed ripe and latex content is lower!!!     Month     [place] Locally Grown Produce  January, February, March: use canned, frozen or dried fruits since lower in latex  April: asparagus, radishes  May: asparagus, broccoli, green onions, greens, peas, radishes, rhubarb  June: asparagus, beets, beans, broccoli, cabbage, cantaloupe, carrots, green onions, greens, lettuce, onions, parsley, peas, radishes, rhubarb, strawberries, watermelons  July: beans, beets, blueberries, broccoli, cabbage, cantaloupe, carrots, cauliflower, celery, cucumbers, eggplant, grapes, green onions, greens, lettuce, onions, parsley, peas, peaches, bell peppers, potatoes, radishes, summer raspberries, squash, sweetcorn, tomatoes, turnips, watermelons  August: apples, beans, beets, blueberries, cabbage, cantaloupe, carrots, cauliflower, celery, cucumbers, eggplant, grapes, green onions, greens, lettuce, onions, parsley, peas, peaches, pears, bell peppers, potatoes, radishes, squash, sweet corn, tomatoes, turnips, watermelons  September: apples, beans, beets, blueberries, cabbage, cantaloupe, carrots, cauliflower, celery, cucumbers, eggplant, grapes, green onions, greens, lettuce, onions, parsley, peas, peaches, pears, bell peppers, plums, potatoes, pumpkins, radishes, fall red raspberries, squash, sweet corn, tomatoes, turnips, watermelons  October: apples, beets, broccoli, cabbage, carrots, cauliflower, celery, green onions, greens, lettuce, parsley, peas, pears, potatoes, pumpkins, radishes, fall red raspberries, squash, turnips  November: broccoli, cabbage, carrots, parsley, pears, peas  December: use canned, frozen or dried fruits since lower in latex    Upto half of latex-sensitive patients show allergic reactions to fruits (avocados, bananas, kiwifruits, papayas, peaches),   Annals of Allergy, 1994. These plants contain the same proteins that are allergens in latex. People with fruit allergies should warn physicians before undergoing procedures which may cause anaphylactic reaction if in contact with latex gloves. Some of the common foods with defined cross-reactivity to latex are avocado, banana, kiwi, chestnut, raw potato, tomato, stone fruits (e.g., peach, cherry), hazelnut, melons, celery, carrot, apple, pear, papaya, and almond. Foods with less well-defined cross-reactivity to latex are peanuts, peppers, citrus fruits, coconut, pineapple, servando, fig, passion fruit, Ugli fruit, and grape. This fruit/latex cross-reactivity is worsened by ethylene, a gas used to hasten commercial ripening. In nature, plants produce low levels of the hormone ethylene, which regulates germination, flowering, and ripening.  Forced ripening by high ethylene concentrations, plants produce allergenic wound-repair proteins, which are similar to wound-repair proteins made during the tapping of rubber trees. Sensitive individuals who ingest the fruit get a higher dose and worse reaction. Some people may even first become sensitized to latex through fruit. Can food processing increase the concentrations of allergenic proteins? Latex-sensitized children (and adults) in [place] often experience allergic reactions after eating bananas ripened artificially with ethylene. In the [place], food distribution centers treat unripe bananas and other produce with ethylene to ripen; not commonly done in [facility] since fruit is tree-ripened there. Does treatment of food with ethylene induce banana proteins that cross-react with latex? (Norm et al.)    References:   Latex in Foods Allergy, http://ehp.niehs.nih.gov/members/2003/5811/5811.html    Search web for [employer] in Season \" for where you live or are at the time you food shop   Management of Latex, ://[facility]. [facility].edu/  search for nih, latex-like proteins in foods

## 2022-10-28 LAB
C-REACTIVE PROTEIN, HIGH SENSITIVITY: 2.7 MG/L
DHEAS (DHEA SULFATE): 67.6 UG/DL (ref 42–290)
GLIADIN PEPTIDE IGG: < 0.4 U/ML
HOMOCYSTEINE: 9.1 UMOL/L
T3 FREE: 2.97 PG/ML (ref 2.02–4.43)
T3 TOTAL: 128 NG/DL (ref 60–181)
THYROID PEROXIDASE ANTIBODY: < 4 IU/ML (ref 0–25)
THYROXINE (T4): 6.8 UG/DL (ref 4.5–10.9)
TISSUE TRANSGLUTAMINASE IGA: 0.4 U/ML
TTG, IGG: < 0.6 U/ML

## 2022-10-29 LAB — ANA SCREEN: NORMAL

## 2022-10-30 LAB — TESTOSTERONE FREE: NORMAL

## 2022-10-31 LAB — ZINC: 81.7 UG/DL (ref 60–120)

## 2022-11-01 LAB — DHEA UNCONJUGATED: 0.82 NG/ML (ref 0.63–4.7)

## 2022-11-02 LAB
VITAMIN B2: 30 NMOL/L (ref 5–50)
VITAMIN B6: 57.6 NMOL/L (ref 20–125)

## 2022-11-03 LAB — VITAMIN B1 WHOLE BLOOD: 111 NMOL/L (ref 70–180)

## 2022-11-10 PROBLEM — E78.5 ELEVATED LIPIDS: Status: ACTIVE | Noted: 2022-11-10

## 2022-11-10 PROBLEM — K90.89 OTHER INTESTINAL MALABSORPTION: Status: ACTIVE | Noted: 2022-11-10

## 2022-11-10 PROBLEM — R35.1 NOCTURIA: Status: ACTIVE | Noted: 2022-11-10

## 2022-12-12 ENCOUNTER — TELEPHONE (OUTPATIENT)
Dept: FAMILY MEDICINE CLINIC | Age: 67
End: 2022-12-12

## 2022-12-12 NOTE — TELEPHONE ENCOUNTER
Pt stopped by to ask what supplements are needed to prevent cataracts? Irma Justin he took a picture but somehow deleted it. Advised NAC, alpha lipoic acid, saffron and lutien. However, told him all people are not suppose to take this unless he advises. Need to double ck. Is this correct? And is it 1 x per day? Also he said he bought cinnamon with Chromium 1000 mg instead of 500 mg. Wants to know if this is ok to do? Said yes, he can take 1000 mg before meals.

## 2022-12-13 NOTE — TELEPHONE ENCOUNTER
Jose Harrison MD  to Me      6:10 AM  Advised for cataracts   NAC, 600 mg BID   alpha lipoic acid, 600 mg BID   saffron 1 cap to start then BID also restores retina in 6-12 months   [id number] -20 mg BID for general eye health       ADVISED PT. HE VOICED UNDERSTANDING.

## 2022-12-14 ENCOUNTER — OFFICE VISIT (OUTPATIENT)
Dept: FAMILY MEDICINE CLINIC | Age: 67
End: 2022-12-14
Payer: MEDICARE

## 2022-12-14 VITALS
DIASTOLIC BLOOD PRESSURE: 78 MMHG | SYSTOLIC BLOOD PRESSURE: 124 MMHG | HEART RATE: 71 BPM | WEIGHT: 224.2 LBS | OXYGEN SATURATION: 96 % | HEIGHT: 70 IN | RESPIRATION RATE: 16 BRPM | TEMPERATURE: 97.2 F | BODY MASS INDEX: 32.1 KG/M2

## 2022-12-14 DIAGNOSIS — H60.332 ACUTE SWIMMER'S EAR OF LEFT SIDE: Primary | ICD-10-CM

## 2022-12-14 PROCEDURE — G8484 FLU IMMUNIZE NO ADMIN: HCPCS | Performed by: NURSE PRACTITIONER

## 2022-12-14 PROCEDURE — G8427 DOCREV CUR MEDS BY ELIG CLIN: HCPCS | Performed by: NURSE PRACTITIONER

## 2022-12-14 PROCEDURE — 1123F ACP DISCUSS/DSCN MKR DOCD: CPT | Performed by: NURSE PRACTITIONER

## 2022-12-14 PROCEDURE — 4130F TOPICAL PREP RX AOE: CPT | Performed by: NURSE PRACTITIONER

## 2022-12-14 PROCEDURE — 1036F TOBACCO NON-USER: CPT | Performed by: NURSE PRACTITIONER

## 2022-12-14 PROCEDURE — 99214 OFFICE O/P EST MOD 30 MIN: CPT | Performed by: NURSE PRACTITIONER

## 2022-12-14 PROCEDURE — G8417 CALC BMI ABV UP PARAM F/U: HCPCS | Performed by: NURSE PRACTITIONER

## 2022-12-14 PROCEDURE — 3017F COLORECTAL CA SCREEN DOC REV: CPT | Performed by: NURSE PRACTITIONER

## 2022-12-14 RX ORDER — PREDNISONE 20 MG/1
40 TABLET ORAL DAILY
Qty: 10 TABLET | Refills: 0 | Status: SHIPPED | OUTPATIENT
Start: 2022-12-14 | End: 2022-12-19

## 2022-12-14 RX ORDER — CIPROFLOXACIN AND DEXAMETHASONE 3; 1 MG/ML; MG/ML
4 SUSPENSION/ DROPS AURICULAR (OTIC) 2 TIMES DAILY
Qty: 7.5 ML | Refills: 0 | Status: SHIPPED | OUTPATIENT
Start: 2022-12-14 | End: 2022-12-21

## 2022-12-14 NOTE — PROGRESS NOTES
SUBJECTIVE:  Marsha Thomason is a 74 y.o. y/o male that presents with Otalgia  . left Ear Complaint    HPI:  Symptoms have been present for 1 day(s). Inciting incident or history of trauma? no  Decreased hearing? Yes  Ear tenderness? Yes  Ear drainage? No  Feeling of fullness? Yes  Dizziness or pre-syncope? No  Associated symptoms - no other symptoms    Patient Active Problem List   Diagnosis    Nephrolithiasis    Hydronephrosis    Kidney stone on left side    Ureteral stricture, left    Kidney stone    Benign prostatic hyperplasia    Renal stones    Right inguinal hernia    Umbilical hernia without obstruction and without gangrene    Gastroesophageal reflux disease without esophagitis    Dependent edema    Dyslipidemia    Other intestinal malabsorption    Elevated lipids    Nocturia           OBJECTIVE:  /78   Pulse 71   Temp 97.2 °F (36.2 °C) (Infrared)   Resp 16   Ht 5' 10\" (1.778 m)   Wt 224 lb 3.2 oz (101.7 kg)   SpO2 96%   BMI 32.17 kg/m²   General appearance: alert, well appearing, and in no distress. HEENT:  left canal red, inflamed, and with purulent discharge  Neck:  Supple without masses, no cervical adenopathy  Skin exam - normal coloration and turgor, no rashes, no suspicious skin lesions noted. Psych:  No evidence of anxiety or depression      ASSESSMENT & PLAN  Marion Buerger was seen today for otalgia. Diagnoses and all orders for this visit:    Acute swimmer's ear of left side  -     ciprofloxacin-dexamethasone (CIPRODEX) 0.3-0.1 % otic suspension; Place 4 drops into the left ear 2 times daily for 7 days  -     predniSONE (DELTASONE) 20 MG tablet;  Take 2 tablets by mouth daily for 5 days      No follow-ups on file.    -Start above treatments  -Patient advised to contact our office immediately if symptoms worsen or persist  -Patient counseled on conservative care including OTC meds

## 2023-01-19 ENCOUNTER — OFFICE VISIT (OUTPATIENT)
Dept: FAMILY MEDICINE CLINIC | Age: 68
End: 2023-01-19
Payer: MEDICARE

## 2023-01-19 VITALS
TEMPERATURE: 97.5 F | DIASTOLIC BLOOD PRESSURE: 68 MMHG | RESPIRATION RATE: 10 BRPM | OXYGEN SATURATION: 97 % | HEIGHT: 70 IN | WEIGHT: 216.6 LBS | HEART RATE: 73 BPM | SYSTOLIC BLOOD PRESSURE: 116 MMHG | BODY MASS INDEX: 31.01 KG/M2

## 2023-01-19 DIAGNOSIS — R73.09 ELEVATED GLUCOSE: ICD-10-CM

## 2023-01-19 DIAGNOSIS — G47.9 SLEEP DIFFICULTIES: ICD-10-CM

## 2023-01-19 DIAGNOSIS — R35.1 NOCTURIA: ICD-10-CM

## 2023-01-19 DIAGNOSIS — R60.0 LOWER EXTREMITY EDEMA: ICD-10-CM

## 2023-01-19 DIAGNOSIS — L53.9 REDNESS: ICD-10-CM

## 2023-01-19 DIAGNOSIS — R53.83 TIRED: ICD-10-CM

## 2023-01-19 DIAGNOSIS — K21.9 GASTROESOPHAGEAL REFLUX DISEASE WITHOUT ESOPHAGITIS: Primary | ICD-10-CM

## 2023-01-19 DIAGNOSIS — K90.89 OTHER INTESTINAL MALABSORPTION: ICD-10-CM

## 2023-01-19 DIAGNOSIS — E78.5 DYSLIPIDEMIA: ICD-10-CM

## 2023-01-19 PROCEDURE — G8484 FLU IMMUNIZE NO ADMIN: HCPCS | Performed by: FAMILY MEDICINE

## 2023-01-19 PROCEDURE — 1123F ACP DISCUSS/DSCN MKR DOCD: CPT | Performed by: FAMILY MEDICINE

## 2023-01-19 PROCEDURE — 1036F TOBACCO NON-USER: CPT | Performed by: FAMILY MEDICINE

## 2023-01-19 PROCEDURE — 3017F COLORECTAL CA SCREEN DOC REV: CPT | Performed by: FAMILY MEDICINE

## 2023-01-19 PROCEDURE — 99215 OFFICE O/P EST HI 40 MIN: CPT | Performed by: FAMILY MEDICINE

## 2023-01-19 PROCEDURE — G8427 DOCREV CUR MEDS BY ELIG CLIN: HCPCS | Performed by: FAMILY MEDICINE

## 2023-01-19 PROCEDURE — G8417 CALC BMI ABV UP PARAM F/U: HCPCS | Performed by: FAMILY MEDICINE

## 2023-01-19 RX ORDER — MELATONIN 3 MG
TABLET ORAL
COMMUNITY

## 2023-01-19 ASSESSMENT — PATIENT HEALTH QUESTIONNAIRE - PHQ9
2. FEELING DOWN, DEPRESSED OR HOPELESS: 0
SUM OF ALL RESPONSES TO PHQ QUESTIONS 1-9: 0
1. LITTLE INTEREST OR PLEASURE IN DOING THINGS: 0
SUM OF ALL RESPONSES TO PHQ9 QUESTIONS 1 & 2: 0

## 2023-01-19 ASSESSMENT — ENCOUNTER SYMPTOMS
ALLERGIC/IMMUNOLOGIC NEGATIVE: 1
SINUS PAIN: 0
ABDOMINAL PAIN: 1
COLOR CHANGE: 1
COUGH: 1

## 2023-01-19 NOTE — PROGRESS NOTES
[address] [facility] [place] [address] [facility] [address]  Dept: [phone]  Dept Fax: [phone]  Loc: [phone]      Elías Colon is a 74 y.o. White male. Bard Samaniego  presents to the [place] [address] clinic today for   Chief Complaint   Patient presents with    Discuss Labs   , and;   1. Gastroesophageal reflux disease without esophagitis    2. Dyslipidemia    3. Lower extremity edema    4. Tired    5. Other intestinal malabsorption    6. Sleep difficulties    7. Nocturia    8. Elevated glucose          I have reviewed Elías Colon medical, surgical and other pertinent history in detail, and have updated medication and allergy information in the computerized patient record. Clinical Care Team:     -Referring Provider for today's consult: self  -Primary Care Provider: Ayesha Daley DO    Medical/Surgical History:   He  has a past medical history of GERD (gastroesophageal reflux disease), BRENDA on CPAP, and Other hyperlipidemia. His  has a past surgical history that includes Cystoscopy (Left, 7/12/15); Appendectomy (1983??); Tonsillectomy; Lithotripsy (Left, 3-18-16); Colonoscopy (3-2016); and hernia repair (10/04/2016). Family/Social History:     His family history includes Sleep Apnea in his father. He  reports that he has never smoked. He has never used smokeless tobacco. He reports current alcohol use. He reports that he does not use drugs.     Medications/Allergies/Immunizations:     His current medication(s) include   Current Outpatient Medications:     DHEA 10 MG TABS, Take by mouth daily (with breakfast), Disp: , Rfl:     B Complex-Biotin-FA (COMPLEX B-50 PROLONGED RELEASE PO), Take 100 mg by mouth 2 times daily (before meals) B-100, Disp: , Rfl:     CINNAMON PO, Take 1,000 mg by mouth 2 times daily (before meals), Disp: , Rfl:     Cholecalciferol (VITAMIN D3) 25 MCG (1000 UT) CAPS, Take 1,000 Units by mouth Daily, Disp: , Rfl:     calcium carbonate (TUMS) 500 MG chewable tablet, Take 1 tablet by mouth daily as needed for Heartburn, Disp: , Rfl:     tiZANidine (ZANAFLEX) 2 MG tablet, as needed (Patient not taking: Reported on 1/19/2023), Disp: , Rfl:     furosemide (LASIX) 20 MG tablet, Take 20 mg by mouth daily as needed (Patient not taking: Reported on 1/19/2023), Disp: , Rfl:     omeprazole (PRILOSEC) 20 MG delayed release capsule, Take 20 mg by mouth daily (Patient not taking: Reported on 1/19/2023), Disp: , Rfl:   Allergies: Patient has no known allergies. Immunizations:   Immunization History   Administered Date(s) Administered    Pneumococcal conjugate PCV20, PF (Prevnar 20) 09/29/2022        History of Present Illness:     Hubert's had concerns including Discuss Labs. Dellia Fabry  presents to the [address] [place] [address] today for;   Chief Complaint   Patient presents with    Discuss Labs   , abnormal labs follow up and these conditions as he  Is looking today for:     1. Gastroesophageal reflux disease without esophagitis    2. Dyslipidemia    3. Lower extremity edema    4. Tired    5. Other intestinal malabsorption    6. Sleep difficulties    7. Nocturia    8. Elevated glucose      HPI    Subjective:     Review of Systems   Constitutional:  Positive for fatigue. HENT:  Negative for sinus pain. Eyes:  Positive for visual disturbance. Respiratory:  Positive for cough. Cardiovascular: Negative. Gastrointestinal:  Positive for abdominal pain. Endocrine: Negative. Genitourinary:  Negative for frequency. Musculoskeletal: Negative. Skin:  Positive for color change. Allergic/Immunologic: Negative. Neurological:  Negative for headaches. Hematological: Negative. Psychiatric/Behavioral:  Negative for sleep disturbance.       Objective:     /68 (Site: Left Upper Arm, Position: Sitting, Cuff Size: Medium Adult)   Pulse 73   Temp 97.5 °F (36.4 °C) (Temporal)   Resp 10 Ht 5' 10\" (1.778 m)   Wt 216 lb 9.6 oz (98.2 kg)   SpO2 97%   BMI 31.08 kg/m²   Physical Exam  Vitals and nursing note reviewed. Constitutional:       Appearance: Normal appearance. HENT:      Head: Normocephalic. Pulmonary:      Effort: Pulmonary effort is normal.   Neurological:      Mental Status: He is alert. Psychiatric:         Mood and Affect: Mood normal.         Thought Content: Thought content normal.          Laboratory Data:   Lab results were searched in Care Everywhere and/or those brought by the pateint were reviewed today with Vlad Rocha and he has a copy of their most recent labs to take home with them as noted below;       Imaging Data:   Imaging Data:       Assessment & Plan:       Impression:  1. Gastroesophageal reflux disease without esophagitis    2. Dyslipidemia    3. Lower extremity edema    4. Tired    5. Other intestinal malabsorption    6. Sleep difficulties    7. Nocturia    8. Elevated glucose      Assessment and Plan:  After reviewing the patients chief complaints, reviewing their labfindings in great detail (with the patient and those accompanying them) which correlate to their chief complaints, symptoms, and or medical conditions; suggestions were made relating to changes in diet and or supplements which may improve the complaints and which will be reflected in their future lab findings; Chief Complaint   Patient presents with    Discuss Labs   ;    Plans for the next visits:  - Abnormal and non-optimal Labs were ordered today to be repeated in the next 120-365 days to assess changes from adjustments in nutrition and or nutrients.    - Patient instructed when having a blood draw to ask the  to divide their lab draws into multiple draws over several days if not feeling good at the time of the lab draw or if either prefers to do several smaller blood draws over several days  -Patient instructed to check with insurer before each lab draw and to go to the lab which the insurer directs them for the most cost effective lab draw with the least patient's cost  - Ron Araiza  will be scheduled subsequent to those results. Paola Corona will bring in his drink, food, supplement log to his next visit    Chronic Problems Addressed on this Visit:                                   1.  Intensity of Service; Uncontrolled items at this visit; Chief Complaint   Patient presents with    Discuss Labs   ; Improved items at this visit and Stable items were discussed at this visit;  2. Patients food, drinks, supplements and symptoms were reviewed with the patient,       - Ron Araiza will bring food, drink, supplements and symptoms log to next visit for inclusion in their record      - 75 better food list reviewed & given to patient with the omega 6 food list to avoid      - The 52 Latex foods list was reviewed and given to the patients with the information on carrageenan         - Gluten in corn and oats abstracts sheet reviewed and given to the patient today   3. Greater than 40 minutes time was spent with the patient face to face on this visit; of which >50% was for counseling and coordination of care, as well as the time spent before and after the visit reviewing the chart, documenting the encounter, reviewing labs,reports, NIH listed studies, making phone calls, etc.      Patients food and drinks were reviewed with the patient,   - They will bring a food drink symptom log to future visits for inclusion in their record    - 75 better food list reviewed & given to patient along with the omega 6 food list to avoid      - Gluten in corn and oats abstracts sheet reviewed and given to the patient today    - 23 Foods containing Latex-like proteins was reviewed and copy to be taken if desired     - Nutrient Supplements list provided and copyto be taken if desired    - [employer] web site offered to patient to review at their convenience by staff with login information    Note:  I have discussed with the patient that with all nutraceuticals, there is often mixed data and emerging research which needs to be monitored; as well as an array of NIH fact sheets on nutrients and supplements, available at www.nih,issue plus [employer]. [place] plus www.Saiseii,org. If I have recommended cinnamon at the request of this patient to assist them in control of their blood sugar, triglyceride, and/or weight issues. I discussed that the patient's clinical use of cinnamon bark, calcium, magnesium, Vitamin D, and pharmaceutical grade CVS omega 3 oil or triple-strength fish oil, and B-50/B-100 time-released B-complex by [address] [facility] will be for a time-limited trial to determine their individual effectiveness and safety in this patient. I also referred the patient to the NMCD: Nutrition, Metabolism, and Cardiovascular Diseases (SecuritiesCard.pl) and concerns about long-term use and hepatotoxicity of cinnamon and other nutrients. I suggested they frequently search nih.gov for the latest non-proprietary information on nutriceuticals as well as consider a subscription to Whois for details on reviewed supplements, or at the least review the nutrient files at [facility], [address] bark, an insulin mimetic, reduces some High Carbohydrate Dietary Impacts. Methylhydroxychalcone polymers insulin-enhancing properties in fat cells are responsible for enhanced glucose uptake, inhibiting hepatic HMG-CoA reductase and lowers lipids. www.jacn. org/content/20/4/327.full     But cinnamon with additives such as Cinnamon Extract are not effective as insulin mimetics.  :eStoreDirectory.at     Nutrients for Start up from [employer] or [employer] for ease to get started now;  Luke Andrade has some useable products;  - Triple Strength Fish Oil, enteric coated  - Vit D-3 5000 IU gel caps  - Iron ferrous sulfate 325 mg tabs  - Centrum Silver look-a-like for most patients, or  - Centrum plain look-a-like if need iron    Local pharmacies or chains such as [employer], have:  - CVS pharmaceutical grade omega 3 is 90% EPA/DHA whereas most Triple strength fish oil are 75% EPA/DHA  - Triple Strength Fish Oil (enteric coated if available) or if not enteric coated, can take from freezer for less burps  - B-50 or B-100 released balanced B complex tabs by [address] [facility] bark 500 mg (without Chromium or extracts)   some brands list 1000 mg / serving of 2 capsules,    some brands have 1000 mg caps with the undesireable chromium extract  - Calcium carbonate/citrate, magnesium oxide/citrate, Vit D-3 as 3-4 tabs/caps/serving     Some Local Brands may contain Zinc which is acceptable for the first bottle or two  - Magnesium oxide 250 mg tabs for those having < 2 bowel movements daily  - Magnesium citrate 200 mg if having > 2 bowel movements/day  - Centrum Silver or look-a-like for most patients, Centrum plain or look-a-like with iron  - Vitamin D-3 comes as 1,000 IU or 2,000 IU or 5,000 IU gel caps or Liquid drops but keep Vitamin D levels <50 but >40     Some brands containing or derived from soy oil or corn oil are OK if not allergic to soy  - Elemental Iron 65 mg tabs at bedtime is available over the counter if need more iron     Usually turns bowel movements grey, green, or black but not a concern  - Apricot Kernel Oil (by Now) for dry skin sensitive perineal or perianal area skin    Nutrients for ongoing use by Mail order for less expense from www. [employer] ;  - Strength Fish Oil , 240 Softgels Item [id number]  -B-100 time released balanced B complex Item #[id number]  - Cinnamon bark 500 mg without Chromium or extract Item #[id number]  - Calcium carbonate 1000 mg, Magnesium oxide 500 mg, Vit D-3 400 IU Item #[id number]  - Magnesium oxide 500 mg tabs Item #355567 if less than 2 bowel movements daily  - [employer] Item #313703 for most patients, One Daily Item #335562 with iron  - Vit D 3  1,000 Item #692935      2,000 IU Item #[id number]   Item #613106     Some brands containing orderived from soy oil or corn oil are OK if not allergic to soy    Nutrients for Special Needs by Mail order for less expense from www. puritan.com;  -Elemental Iron 65 mg tabs Item #778951 if need more iron for low iron on labs    Usually turns bowel movements grey, green or black but not a concern  - Time released Niacin 250 mg Item #697637 for cold intolerance, low libido or impotence  - DHEA 50 mg Item #346558 for improving DHEA levels on labs if having Fatigue    If stools too loose substitute for your Magnesium oxide using;   Magnesium citrate 200 mg tabs (NOT liquid) at [employer]   Magnesium gluconate 550 mg by Ary Guillaume at Cover or Kähu. com  Magnesium chloride foot soaks or body sprays  www.I-Shake   Magnesium chloride flakes 14.99 Item #: [id number] if back-ordered, get spray  Magnesium threonate, Magtein also helps mental clarity and sleep    Food Drink Symptom Log;  I asked this patient to track these items and any other symptoms on their list on a weekly basis to documenttheir progress or lack of same. This can be done on the symptom tracking sheet I gave them at today's visit but looks like this:                                                      Rate on scale of 0-10 with zero = not noticeable  Symptom:                            Week 1               2                 3                 4               Etc            Hair loss    Foot cramps    Paresthesia    Aches    IBS (irritable bowel)    Constipation    Diarrhea  Nocturia (up to bathroom at night)    Fatigue/Energy level  Stress      On the other side of the sheet they can track their food, drink, environment, activity, symptoms etc      Avoiding Latex-like proteins in my foods;     Avocados, Bananas, Celery, Figs & Kiwi proteins have latex-like proteins to inflame our immune systems, plus 47 more foods  How Can I Have A Latex Allergy? Eating foods with latex-like protein exposes us to latex allergies. Our body cannot tell the differencebetween these latex-like proteins and latex from rubber products since many people are allergic to fruit, vegetables and latex. Read labels on pre-packaged foods. This list to avoid is only a guide if you are known allergicto latex or have a latex rash on your chin, cheeks and lines on your neck and chest. The amount of latex is different in each food product or fruit variety. Avoid out of Season if not grown locally:   Melon, Nectarine, Papaya, Cherry, Passion fruit, Plum, Chestnuts, and Tomato. Avocado, Banana, Celery, Figs, and Kiwi always contain Latex-like protein. Whats in Season? Strawberries taste better in June than December because June is strawberry season so buy locally grown produce \"in season\" for the best flavor, cost, and less Latex. Locally grown produce not only tastes great but also requires little or no ethylene exposure in food distribution so has less latex content. Out of season: use canned, frozen, or dried since those are processed ripe and latex content is lower!!!     Month     [place] Locally Grown Produce  January, February, March: use canned, frozen or dried fruits since lower in latex  April: asparagus, radishes  May: asparagus, broccoli, green onions, greens, peas, radishes, rhubarb  Zoe: asparagus, beets, beans, broccoli, cabbage, cantaloupe, carrots, green onions, greens, lettuce, onions, parsley, peas, radishes, rhubarb, strawberries, watermelons  July: beans, beets, blueberries, broccoli, cabbage, cantaloupe, carrots, cauliflower, celery, cucumbers, eggplant, grapes, green onions, greens, lettuce, onions, parsley, peas, peaches, bell peppers, potatoes, radishes, summer raspberries, squash, sweetcorn, tomatoes, turnips, watermelons  August: apples, beans, beets, blueberries, cabbage, cantaloupe, carrots, cauliflower, celery, cucumbers, eggplant, grapes, green onions, greens, lettuce, onions, parsley, peas, peaches, pears, bell peppers, potatoes, radishes, squash, sweet corn, tomatoes, turnips, watermelons  September: apples, beans, beets, blueberries, cabbage, cantaloupe, carrots, cauliflower, celery, cucumbers, eggplant, grapes, green onions, greens, lettuce, onions, parsley, peas, peaches, pears, bell peppers, plums, potatoes, pumpkins, radishes, fall red raspberries, squash, sweet corn, tomatoes, turnips, watermelons  October: apples, beets, broccoli, cabbage, carrots, cauliflower, celery, green onions, greens, lettuce, parsley, peas, pears, potatoes, pumpkins, radishes, fall red raspberries, squash, turnips  November: broccoli, cabbage, carrots, parsley, pears, peas  December: use canned, frozen or dried fruits since lower in latex    Upto half of latex-sensitive patients show allergic reactions to fruits (avocados, bananas, kiwifruits, papayas, peaches),   Annals of Allergy, 1994. These plants contain the same proteins that are allergens in latex. People with fruit allergies should warn physicians before undergoing procedures which may cause anaphylactic reaction if in contact with latex gloves. Some of the common foods with defined cross-reactivity to latex are avocado, banana, kiwi, chestnut, raw potato, tomato, stone fruits (e.g., peach, cherry), hazelnut, melons, celery, carrot, apple, pear, papaya, and almond. Foods with less well-defined cross-reactivity to latex are peanuts, peppers, citrus fruits, coconut, pineapple, servando, fig, passion fruit, Ugli fruit, and grape. This fruit/latex cross-reactivity is worsened by ethylene, a gas used to hasten commercial ripening. In nature, plants produce low levels of the hormone ethylene, which regulates germination, flowering, and ripening.  Forced ripening by high ethylene concentrations, plants produce allergenic wound-repair proteins, which are similar to wound-repair proteins made during the tapping of rubber trees. Sensitive individuals who ingest the fruit get a higher dose and worse reaction. Some people may even first become sensitized to latex through fruit. Can food processing increase the concentrations of allergenic proteins? Latex-sensitized children (and adults) in [place] often experience allergic reactions after eating bananas ripened artificially with ethylene. In the [place], food distribution centers treat unripe bananas and other produce with ethylene to ripen; not commonly done in [facility] since fruit is tree-ripened there. Does treatment of food with ethylene induce banana proteins that cross-react with latex? (Norm et al.)    References:   Latex in Foods Allergy, http://ehp.niehs.nih.gov/members/2003/5811/5811.html    Search web for [employer] in Season \" for where you live or are at the time you food shop   Management of Latex, ://[email or web address]. [facility].edu/  search for nih, latex-like proteins in foods

## 2023-03-15 ENCOUNTER — TELEPHONE (OUTPATIENT)
Dept: FAMILY MEDICINE CLINIC | Age: 68
End: 2023-03-15

## 2023-03-15 DIAGNOSIS — H91.90 HEARING LOSS, UNSPECIFIED HEARING LOSS TYPE, UNSPECIFIED LATERALITY: Primary | ICD-10-CM

## 2023-03-15 NOTE — TELEPHONE ENCOUNTER
Pt is requesting a referral to [facility] audiology for his annual hearing eval for his hearing aids

## 2023-07-06 ENCOUNTER — NURSE ONLY (OUTPATIENT)
Dept: LAB | Age: 68
End: 2023-07-06

## 2023-07-06 ENCOUNTER — OFFICE VISIT (OUTPATIENT)
Dept: FAMILY MEDICINE CLINIC | Age: 68
End: 2023-07-06
Payer: MEDICARE

## 2023-07-06 VITALS
DIASTOLIC BLOOD PRESSURE: 66 MMHG | HEIGHT: 70 IN | OXYGEN SATURATION: 95 % | WEIGHT: 218.2 LBS | HEART RATE: 63 BPM | RESPIRATION RATE: 10 BRPM | TEMPERATURE: 97.7 F | BODY MASS INDEX: 31.24 KG/M2 | SYSTOLIC BLOOD PRESSURE: 102 MMHG

## 2023-07-06 DIAGNOSIS — G47.9 SLEEP DIFFICULTIES: ICD-10-CM

## 2023-07-06 DIAGNOSIS — R53.83 TIRED: ICD-10-CM

## 2023-07-06 DIAGNOSIS — R73.09 ELEVATED GLUCOSE: ICD-10-CM

## 2023-07-06 DIAGNOSIS — K21.9 GASTROESOPHAGEAL REFLUX DISEASE WITHOUT ESOPHAGITIS: Primary | ICD-10-CM

## 2023-07-06 DIAGNOSIS — K90.89 OTHER INTESTINAL MALABSORPTION: ICD-10-CM

## 2023-07-06 DIAGNOSIS — R60.0 LOWER EXTREMITY EDEMA: ICD-10-CM

## 2023-07-06 DIAGNOSIS — E78.5 DYSLIPIDEMIA: ICD-10-CM

## 2023-07-06 DIAGNOSIS — R35.1 NOCTURIA: ICD-10-CM

## 2023-07-06 DIAGNOSIS — L53.9 REDNESS: ICD-10-CM

## 2023-07-06 DIAGNOSIS — K21.9 GASTROESOPHAGEAL REFLUX DISEASE WITHOUT ESOPHAGITIS: ICD-10-CM

## 2023-07-06 LAB
25(OH)D3 SERPL-MCNC: 56 NG/ML (ref 30–100)
BASOPHILS ABSOLUTE: 0 THOU/MM3 (ref 0–0.1)
BASOPHILS NFR BLD AUTO: 0.6 %
CALCIUM SERPL-MCNC: 9.7 MG/DL (ref 8.5–10.5)
CHOLEST SERPL-MCNC: 271 MG/DL (ref 100–199)
COHGB MFR BLDV: 6.8 % CO SAT
DEPRECATED MEAN GLUCOSE BLD GHB EST-ACNC: 114 MG/DL (ref 70–126)
DEPRECATED RDW RBC AUTO: 44.9 FL (ref 35–45)
EOSINOPHIL NFR BLD AUTO: 1.5 %
EOSINOPHILS ABSOLUTE: 0.1 THOU/MM3 (ref 0–0.4)
ERYTHROCYTE [DISTWIDTH] IN BLOOD BY AUTOMATED COUNT: 13.1 % (ref 11.5–14.5)
ERYTHROCYTE [SEDIMENTATION RATE] IN BLOOD BY WESTERGREN METHOD: 4 MM/HR (ref 0–10)
HBA1C MFR BLD HPLC: 5.8 % (ref 4.4–6.4)
HCT VFR BLD AUTO: 51.8 % (ref 42–52)
HDLC SERPL-MCNC: 44 MG/DL
HGB BLD-MCNC: 17.3 GM/DL (ref 14–18)
IMM GRANULOCYTES # BLD AUTO: 0.03 THOU/MM3 (ref 0–0.07)
IMM GRANULOCYTES NFR BLD AUTO: 0.5 %
LDLC SERPL CALC-MCNC: 199 MG/DL
LYMPHOCYTES ABSOLUTE: 2.2 THOU/MM3 (ref 1–4.8)
LYMPHOCYTES NFR BLD AUTO: 33.1 %
MAGNESIUM SERPL-MCNC: 2.3 MG/DL (ref 1.6–2.4)
MCH RBC QN AUTO: 31.5 PG (ref 26–33)
MCHC RBC AUTO-ENTMCNC: 33.4 GM/DL (ref 32.2–35.5)
MCV RBC AUTO: 94.4 FL (ref 80–94)
MONOCYTES ABSOLUTE: 0.6 THOU/MM3 (ref 0.4–1.3)
MONOCYTES NFR BLD AUTO: 9 %
NEUTROPHILS NFR BLD AUTO: 55.3 %
NRBC BLD AUTO-RTO: 0 /100 WBC
PLATELET # BLD AUTO: 305 THOU/MM3 (ref 130–400)
PMV BLD AUTO: 9.8 FL (ref 9.4–12.4)
PTH-INTACT SERPL-MCNC: 50.3 PG/ML (ref 15–65)
RBC # BLD AUTO: 5.49 MILL/MM3 (ref 4.7–6.1)
SEGMENTED NEUTROPHILS ABSOLUTE COUNT: 3.6 THOU/MM3 (ref 1.8–7.7)
TRIGL SERPL-MCNC: 142 MG/DL (ref 0–199)
WBC # BLD AUTO: 6.5 THOU/MM3 (ref 4.8–10.8)

## 2023-07-06 PROCEDURE — 1123F ACP DISCUSS/DSCN MKR DOCD: CPT | Performed by: FAMILY MEDICINE

## 2023-07-06 PROCEDURE — G8417 CALC BMI ABV UP PARAM F/U: HCPCS | Performed by: FAMILY MEDICINE

## 2023-07-06 PROCEDURE — G8427 DOCREV CUR MEDS BY ELIG CLIN: HCPCS | Performed by: FAMILY MEDICINE

## 2023-07-06 PROCEDURE — 1036F TOBACCO NON-USER: CPT | Performed by: FAMILY MEDICINE

## 2023-07-06 PROCEDURE — 3017F COLORECTAL CA SCREEN DOC REV: CPT | Performed by: FAMILY MEDICINE

## 2023-07-06 PROCEDURE — 99213 OFFICE O/P EST LOW 20 MIN: CPT | Performed by: FAMILY MEDICINE

## 2023-07-06 RX ORDER — CHLORAL HYDRATE 500 MG
2 CAPSULE ORAL
COMMUNITY

## 2023-07-06 SDOH — ECONOMIC STABILITY: FOOD INSECURITY: WITHIN THE PAST 12 MONTHS, THE FOOD YOU BOUGHT JUST DIDN'T LAST AND YOU DIDN'T HAVE MONEY TO GET MORE.: NEVER TRUE

## 2023-07-06 SDOH — ECONOMIC STABILITY: INCOME INSECURITY: HOW HARD IS IT FOR YOU TO PAY FOR THE VERY BASICS LIKE FOOD, HOUSING, MEDICAL CARE, AND HEATING?: NOT VERY HARD

## 2023-07-06 SDOH — ECONOMIC STABILITY: HOUSING INSECURITY
IN THE LAST 12 MONTHS, WAS THERE A TIME WHEN YOU DID NOT HAVE A STEADY PLACE TO SLEEP OR SLEPT IN A SHELTER (INCLUDING NOW)?: NO

## 2023-07-06 SDOH — ECONOMIC STABILITY: FOOD INSECURITY: WITHIN THE PAST 12 MONTHS, YOU WORRIED THAT YOUR FOOD WOULD RUN OUT BEFORE YOU GOT MONEY TO BUY MORE.: NEVER TRUE

## 2023-07-06 NOTE — PROGRESS NOTES
[address] [place] [address]  Dept: [phone]  Dept Fax: [phone]  Loc: [phone]      Chiot Schumacher is a 79 y.o. White male. Adia Mariano  presents to the [address] [place] [address] today for   Chief Complaint   Patient presents with    Discuss Labs     Forgot to have completed    Gastroesophageal Reflux    Headache    Sleep Problem     Staying asleep     , and;   1. Gastroesophageal reflux disease without esophagitis    2. Sleep difficulties          I have reviewed Chito Schumacher medical, surgical and other pertinent history in detail, and have updated medication and allergy information in the computerized patient record. Clinical Care Team:     -Referring Provider for today's consult: self  -Primary Care Provider: OLGA Fox - CNP    Medical/Surgical History:   He  has a past medical history of GERD (gastroesophageal reflux disease), BRENDA on CPAP, and Other hyperlipidemia. His  has a past surgical history that includes Cystoscopy (Left, 7/12/15); Appendectomy (1983??); Tonsillectomy; Lithotripsy (Left, 3-18-16); Colonoscopy (3-2016); and hernia repair (10/04/2016). Family/Social History:     His family history includes Sleep Apnea in his father. He  reports that he has never smoked. He has never used smokeless tobacco. He reports current alcohol use. He reports that he does not use drugs.     Medications/Allergies/Immunizations:     His current medication(s) include   Current Outpatient Medications:     B Complex-Biotin-FA (COMPLEX B-50 PROLONGED RELEASE PO), Take 100 mg by mouth 2 times daily (before meals) B-100, Disp: , Rfl:     CINNAMON PO, Take 1,000 mg by mouth 2 times daily (before meals), Disp: , Rfl:     Cholecalciferol (VITAMIN D3) 25 MCG (1000 UT) CAPS, Take 1,000 Units by mouth Daily, Disp: , Rfl:     omeprazole (PRILOSEC) 20 MG delayed release capsule, Take 1 capsule

## 2023-07-06 NOTE — PATIENT INSTRUCTIONS
Thank you   Thank you for trusting us with your healthcare needs. You may receive a survey regarding today's visit. It would help us out if you would take a few moments to provide your feedback. We value your input. Please bring in ALL medications BOTTLES, including inhalers, herbal supplements, over the counter, prescribed & non-prescribed medicine. The office would like actual medication bottles and a list.   Please note our OFFICE POLICIES:   Prior to getting your labs drawn, please check with your insurance company for benefits and eligibility of lab services. Often, insurance companies cover certain tests for preventative visits only. It is patient's responsibility to see what is covered. We are unable to change a diagnosis after the test has been performed. Lab orders will not be re-printed. Please hold onto your original lab orders and take them to your lab to be completed. If you no show your scheduled appointment three times, you will be dismissed from this practice. Reschedules must be completed 24 hours prior to your schedule appointment. If the list below has been completed, PLEASE FAX RECORDS TO OUR OFFICE @ [phone].  Once the records have been received we will update your records at our office:  Health Maintenance Due   Topic Date Due    COVID-19 Vaccine (1) Never done    Hepatitis C screen  Never done    DTaP/Tdap/Td vaccine (1 - Tdap) Never done    Shingles vaccine (1 of 2) Never done    Annual Wellness Visit (AWV)  Never done

## 2023-07-07 LAB
C-REACTIVE PROTEIN, HIGH SENSITIVITY: 2.3 MG/L
DHEA-S SERPL-MCNC: 125 UG/DL (ref 42–290)
TESTOSTERONE FREE: NORMAL

## 2023-07-09 LAB — DHEA SERPL-MCNC: 1.16 NG/ML (ref 0.63–4.7)

## 2023-07-31 ENCOUNTER — OFFICE VISIT (OUTPATIENT)
Dept: FAMILY MEDICINE CLINIC | Age: 68
End: 2023-07-31
Payer: MEDICARE

## 2023-07-31 VITALS
SYSTOLIC BLOOD PRESSURE: 108 MMHG | WEIGHT: 218 LBS | HEIGHT: 70 IN | BODY MASS INDEX: 31.21 KG/M2 | HEART RATE: 57 BPM | OXYGEN SATURATION: 98 % | DIASTOLIC BLOOD PRESSURE: 62 MMHG | RESPIRATION RATE: 10 BRPM | TEMPERATURE: 97.7 F

## 2023-07-31 DIAGNOSIS — K90.89 OTHER INTESTINAL MALABSORPTION: ICD-10-CM

## 2023-07-31 DIAGNOSIS — E78.5 DYSLIPIDEMIA: ICD-10-CM

## 2023-07-31 DIAGNOSIS — H60.332 ACUTE SWIMMER'S EAR OF LEFT SIDE: ICD-10-CM

## 2023-07-31 DIAGNOSIS — E78.5 ELEVATED LIPIDS: ICD-10-CM

## 2023-07-31 DIAGNOSIS — R35.1 NOCTURIA: ICD-10-CM

## 2023-07-31 DIAGNOSIS — R60.0 LOWER EXTREMITY EDEMA: ICD-10-CM

## 2023-07-31 DIAGNOSIS — K90.89 OTHER SPECIFIED INTESTINAL MALABSORPTION: ICD-10-CM

## 2023-07-31 DIAGNOSIS — G47.9 SLEEP DIFFICULTIES: ICD-10-CM

## 2023-07-31 DIAGNOSIS — L53.9 REDNESS: ICD-10-CM

## 2023-07-31 DIAGNOSIS — I51.89 DIASTOLIC DYSFUNCTION: ICD-10-CM

## 2023-07-31 DIAGNOSIS — R73.09 ELEVATED GLUCOSE: ICD-10-CM

## 2023-07-31 DIAGNOSIS — M26.629 TMJ SYNDROME: ICD-10-CM

## 2023-07-31 DIAGNOSIS — K21.9 GASTROESOPHAGEAL REFLUX DISEASE WITHOUT ESOPHAGITIS: Primary | ICD-10-CM

## 2023-07-31 DIAGNOSIS — R60.9 DEPENDENT EDEMA: ICD-10-CM

## 2023-07-31 DIAGNOSIS — H91.90 HEARING LOSS, UNSPECIFIED HEARING LOSS TYPE, UNSPECIFIED LATERALITY: ICD-10-CM

## 2023-07-31 DIAGNOSIS — R53.83 TIRED: ICD-10-CM

## 2023-07-31 PROCEDURE — 1036F TOBACCO NON-USER: CPT | Performed by: FAMILY MEDICINE

## 2023-07-31 PROCEDURE — G8417 CALC BMI ABV UP PARAM F/U: HCPCS | Performed by: FAMILY MEDICINE

## 2023-07-31 PROCEDURE — 1123F ACP DISCUSS/DSCN MKR DOCD: CPT | Performed by: FAMILY MEDICINE

## 2023-07-31 PROCEDURE — 3017F COLORECTAL CA SCREEN DOC REV: CPT | Performed by: FAMILY MEDICINE

## 2023-07-31 PROCEDURE — 99214 OFFICE O/P EST MOD 30 MIN: CPT | Performed by: FAMILY MEDICINE

## 2023-07-31 PROCEDURE — 4130F TOPICAL PREP RX AOE: CPT | Performed by: FAMILY MEDICINE

## 2023-07-31 PROCEDURE — G8427 DOCREV CUR MEDS BY ELIG CLIN: HCPCS | Performed by: FAMILY MEDICINE

## 2023-07-31 RX ORDER — PSEUDOEPHEDRINE HCL 120 MG
1 TABLET, EXTENDED RELEASE ORAL 2 TIMES DAILY WITH MEALS
COMMUNITY

## 2023-07-31 NOTE — PROGRESS NOTES
[address] [facility]. [address] [place], [place] [id number]  Dept: [phone]  Dept Fax: [phone]  Loc: [phone]      Blake Castellano is a 79 y.o. White male. Mario Durham  presents to the [address] [place] [address] today for   Chief Complaint   Patient presents with    Discuss Labs   , and;   1. Gastroesophageal reflux disease without esophagitis    2. Sleep difficulties    3. Hearing loss, unspecified hearing loss type, unspecified laterality    4. Dyslipidemia    5. Lower extremity edema    6. Tired    7. Other intestinal malabsorption    8. Nocturia    9. Elevated glucose    10. Redness    11. Acute swimmer's ear of left side    12. Dependent edema    13. Diastolic dysfunction    14. TMJ syndrome    15. Other specified intestinal malabsorption    16. Elevated lipids          I have reviewed Blake Castellano medical, surgical and other pertinent history in detail, and have updated medication and allergy information in the computerized patient record. Clinical Care Team:     -Referring Provider for today's consult: self  -Primary Care Provider: OLGA Hubbard - CNP    Medical/Surgical History:   He  has a past medical history of GERD (gastroesophageal reflux disease), Hyperlipidemia, BRENDA on CPAP, and Other hyperlipidemia. His  has a past surgical history that includes Cystoscopy (Left, 7/12/15); Appendectomy (1983??); Tonsillectomy; Lithotripsy (Left, 3-18-16); Colonoscopy (3-2016); and hernia repair (10/04/2016). Family/Social History:     His family history includes Sleep Apnea in his father. He  reports that he has never smoked. He has never used smokeless tobacco. He reports current alcohol use. He reports that he does not use drugs.     Medications/Allergies/Immunizations:     His current medication(s) include   Current Outpatient Medications:     Magnesium (CVS TRIPLE MAGNESIUM COMPLEX) 400 MG CAPS, Take 1 capsule

## 2023-09-07 ENCOUNTER — OFFICE VISIT (OUTPATIENT)
Dept: FAMILY MEDICINE CLINIC | Age: 68
End: 2023-09-07
Payer: MEDICARE

## 2023-09-07 VITALS
WEIGHT: 211.8 LBS | TEMPERATURE: 98.1 F | DIASTOLIC BLOOD PRESSURE: 64 MMHG | HEART RATE: 63 BPM | SYSTOLIC BLOOD PRESSURE: 110 MMHG | BODY MASS INDEX: 30.32 KG/M2 | OXYGEN SATURATION: 96 % | HEIGHT: 70 IN | RESPIRATION RATE: 14 BRPM

## 2023-09-07 DIAGNOSIS — Z87.19 HX OF UMBILICAL HERNIA REPAIR: ICD-10-CM

## 2023-09-07 DIAGNOSIS — Z98.890 HX OF UMBILICAL HERNIA REPAIR: ICD-10-CM

## 2023-09-07 DIAGNOSIS — Z00.00 INITIAL MEDICARE ANNUAL WELLNESS VISIT: Primary | ICD-10-CM

## 2023-09-07 PROCEDURE — 1123F ACP DISCUSS/DSCN MKR DOCD: CPT | Performed by: NURSE PRACTITIONER

## 2023-09-07 PROCEDURE — 3017F COLORECTAL CA SCREEN DOC REV: CPT | Performed by: NURSE PRACTITIONER

## 2023-09-07 PROCEDURE — G0438 PPPS, INITIAL VISIT: HCPCS | Performed by: NURSE PRACTITIONER

## 2023-09-07 ASSESSMENT — PATIENT HEALTH QUESTIONNAIRE - PHQ9
1. LITTLE INTEREST OR PLEASURE IN DOING THINGS: 0
SUM OF ALL RESPONSES TO PHQ QUESTIONS 1-9: 0
2. FEELING DOWN, DEPRESSED OR HOPELESS: 0
SUM OF ALL RESPONSES TO PHQ QUESTIONS 1-9: 0
SUM OF ALL RESPONSES TO PHQ9 QUESTIONS 1 & 2: 0

## 2023-09-07 ASSESSMENT — LIFESTYLE VARIABLES
HOW MANY STANDARD DRINKS CONTAINING ALCOHOL DO YOU HAVE ON A TYPICAL DAY: 1 OR 2
HOW OFTEN DO YOU HAVE A DRINK CONTAINING ALCOHOL: MONTHLY OR LESS

## 2023-09-07 NOTE — PATIENT INSTRUCTIONS
Healthy Heart: Care Instructions  Your Care Instructions     Coronary artery disease, also called heart disease, occurs when a substance called plaque builds up in the vessels that supply oxygen-rich blood to your heart muscle. This can narrow the blood vessels and reduce blood flow. A heart attack happens when blood flow is completely blocked. A high-fat diet, smoking, and other factors increase the risk of heart disease. Your doctor has found that you have a chance of having heart disease. You can do lots of things to keep your heart healthy. It may not be easy, but you can change your diet, exercise more, and quit smoking. These steps really work to lower your chance of heart disease. Follow-up care is a key part of your treatment and safety. Be sure to make and go to all appointments, and call your doctor if you are having problems. It's also a good idea to know your test results and keep a list of the medicines you take. How can you care for yourself at home? Diet    Use less salt when you cook and eat. This helps lower your blood pressure. Taste food before salting. Add only a little salt when you think you need it. With time, your taste buds will adjust to less salt. Eat fewer snack items, fast foods, canned soups, and other high-salt, high-fat, processed foods. Read food labels and try to avoid saturated and trans fats. They increase your risk of heart disease by raising cholesterol levels. Limit the amount of solid fat-butter, margarine, and shortening-you eat. Use olive, peanut, or canola oil when you cook. Bake, broil, and steam foods instead of frying them. Eat a variety of fruit and vegetables every day. Dark green, deep orange, red, or yellow fruits and vegetables are especially good for you. Examples include spinach, carrots, peaches, and berries. Foods high in fiber can reduce your cholesterol and provide important vitamins and minerals.  High-fiber foods include whole-grain

## 2023-09-07 NOTE — PROGRESS NOTES
Medicare Annual Wellness Visit    Alvarez Mai is here for Medicare AWV    Assessment & Plan   Initial Medicare annual wellness visit  Hx of umbilical hernia repair  -     Dion Navarro MD, General Surgery, [facility]ick  Recommendations for Preventive Services Due: see orders and patient instructions/AVS.  Recommended screening schedule for the next 5-10 years is provided to the patient in written form: see Patient Instructions/AVS.     Return in about 1 year (around 9/7/2024) for awv. Subjective   The following acute and/or chronic problems were also addressed today:      Left flank pain last week but improved now  Hx of kidney stones  No n/v  No changes in urinary symptoms   Notes he is getting up more often at night during the week. Was on supplement but no longer available. But he is on another supplement just not working as well. Declines pharmaceuticals for BPH/nocturia    PSA 10/2022  0.66    Hyperlipidemia  The 10-year ASCVD risk score (Snehal YEUNG, et al., 2019) is: 13.9%  Following with Dr Celso Villavicencio sleeping  Chronic issue  Magnesium causes diarrhea, still trying to find balance with that. Taking a sleep aide OTC  Unsure if he snores, used to wake himself up, but got an appliance which he wears infrequently. Has tried CPAP in the past with no improvement      No longer having swelling of legs  No chest pain/sob      Hx of umbilical hernia repair 6927 with Dr Nikkie Combs coming out. Was addressed in 2017 but not improved  Some discomfort if hit in right spot  Some drainage/scabbed area  No fevers    Patient's complete Health Risk Assessment and screening values have been reviewed and are found in Flowsheets. The following problems were reviewed today and where indicated follow up appointments were made and/or referrals ordered.     Positive Risk Factor Screenings with Interventions:                 Weight and Activity:  Physical Activity: Inactive    Days of Exercise

## 2023-09-27 ENCOUNTER — OFFICE VISIT (OUTPATIENT)
Dept: SURGERY | Age: 68
End: 2023-09-27
Payer: MEDICARE

## 2023-09-27 VITALS
SYSTOLIC BLOOD PRESSURE: 124 MMHG | WEIGHT: 211.2 LBS | HEART RATE: 61 BPM | DIASTOLIC BLOOD PRESSURE: 66 MMHG | TEMPERATURE: 97.7 F | BODY MASS INDEX: 30.24 KG/M2 | RESPIRATION RATE: 16 BRPM | HEIGHT: 70 IN | OXYGEN SATURATION: 97 %

## 2023-09-27 DIAGNOSIS — T81.89XA SUTURE GRANULOMA, INITIAL ENCOUNTER: Primary | ICD-10-CM

## 2023-09-27 PROCEDURE — 3017F COLORECTAL CA SCREEN DOC REV: CPT | Performed by: SURGERY

## 2023-09-27 PROCEDURE — G8417 CALC BMI ABV UP PARAM F/U: HCPCS | Performed by: SURGERY

## 2023-09-27 PROCEDURE — G8427 DOCREV CUR MEDS BY ELIG CLIN: HCPCS | Performed by: SURGERY

## 2023-09-27 PROCEDURE — 1123F ACP DISCUSS/DSCN MKR DOCD: CPT | Performed by: SURGERY

## 2023-09-27 PROCEDURE — 99213 OFFICE O/P EST LOW 20 MIN: CPT | Performed by: SURGERY

## 2023-09-27 PROCEDURE — 1036F TOBACCO NON-USER: CPT | Performed by: SURGERY

## 2023-09-28 ASSESSMENT — ENCOUNTER SYMPTOMS
RHINORRHEA: 0
VOICE CHANGE: 0
APNEA: 0
EYE PAIN: 0
FACIAL SWELLING: 0
SINUS PRESSURE: 0
EYE DISCHARGE: 0
EYE REDNESS: 0
CHOKING: 0
ABDOMINAL DISTENTION: 0
BACK PAIN: 0
TROUBLE SWALLOWING: 0
SHORTNESS OF BREATH: 0
VOMITING: 0
BLOOD IN STOOL: 0
STRIDOR: 0
ANAL BLEEDING: 0
COLOR CHANGE: 0
CHEST TIGHTNESS: 0
COUGH: 0
CONSTIPATION: 0
EYE ITCHING: 0
ALLERGIC/IMMUNOLOGIC NEGATIVE: 1
PHOTOPHOBIA: 0
DIARRHEA: 0
WHEEZING: 0
NAUSEA: 0
SORE THROAT: 0
RECTAL PAIN: 0
ABDOMINAL PAIN: 0

## 2023-09-28 NOTE — PROGRESS NOTES
Respiratory:  Negative for apnea, cough, choking, chest tightness, shortness of breath, wheezing and stridor. Cardiovascular:  Negative for chest pain, palpitations and leg swelling. Gastrointestinal:  Negative for abdominal distention, abdominal pain, anal bleeding, blood in stool, constipation, diarrhea, nausea, rectal pain and vomiting. Endocrine: Negative. Genitourinary:  Negative for decreased urine volume, difficulty urinating, dysuria, enuresis, flank pain, frequency, genital sores, hematuria, penile discharge, penile pain, penile swelling, scrotal swelling, testicular pain and urgency. Musculoskeletal:  Negative for arthralgias, back pain, gait problem, joint swelling, myalgias, neck pain and neck stiffness. Skin:  Negative for color change, pallor, rash and wound. Allergic/Immunologic: Negative. Neurological:  Negative for dizziness, tremors, seizures, syncope, facial asymmetry, speech difficulty, weakness, light-headedness, numbness and headaches. Hematological:  Negative for adenopathy. Does not bruise/bleed easily. Psychiatric/Behavioral:  Negative for agitation, behavioral problems, confusion, decreased concentration, dysphoric mood, hallucinations, self-injury, sleep disturbance and suicidal ideas. The patient is not nervous/anxious and is not hyperactive.         Past Medical History:   Diagnosis Date    GERD (gastroesophageal reflux disease)     Hyperlipidemia     BRENDA on CPAP     Other hyperlipidemia        Past Surgical History:   Procedure Laterality Date    APPENDECTOMY  1983??    COLONOSCOPY  03/2016    COLONOSCOPY  06/03/2023    Dr. Gabino Bashir Left 07/12/2015    Left Ureteroscopy, Pyelogram, Left Ureteral Dilation, Stent Insertion - Dr. Vijay Olivarez  10/04/2016    Robotic Right Inguinal Hernia Repair, Umbilical Hernia Repair - Dr. Bernadette Park    LITHOTRIPSY Left 03/18/2016    LT ESWL    TONSILLECTOMY      as a child       Current Outpatient

## 2023-10-18 ENCOUNTER — PROCEDURE VISIT (OUTPATIENT)
Dept: SURGERY | Age: 68
End: 2023-10-18

## 2023-10-18 VITALS
OXYGEN SATURATION: 99 % | WEIGHT: 215.5 LBS | TEMPERATURE: 97.4 F | SYSTOLIC BLOOD PRESSURE: 104 MMHG | BODY MASS INDEX: 30.92 KG/M2 | HEART RATE: 62 BPM | DIASTOLIC BLOOD PRESSURE: 68 MMHG

## 2023-10-18 DIAGNOSIS — T81.89XA SUTURE GRANULOMA, INITIAL ENCOUNTER: Primary | ICD-10-CM

## 2023-10-24 ASSESSMENT — ENCOUNTER SYMPTOMS
BACK PAIN: 0
SORE THROAT: 0
DIARRHEA: 0
VOICE CHANGE: 0
CHOKING: 0
VOMITING: 0
SHORTNESS OF BREATH: 0
STRIDOR: 0
TROUBLE SWALLOWING: 0
CHEST TIGHTNESS: 0
RECTAL PAIN: 0
ALLERGIC/IMMUNOLOGIC NEGATIVE: 1
RHINORRHEA: 0
COLOR CHANGE: 0
NAUSEA: 0
COUGH: 0
EYE DISCHARGE: 0
BLOOD IN STOOL: 0
ANAL BLEEDING: 0
ABDOMINAL PAIN: 0
CONSTIPATION: 0
SINUS PRESSURE: 0
EYE REDNESS: 0
EYE ITCHING: 0
FACIAL SWELLING: 0
PHOTOPHOBIA: 0
APNEA: 0
EYE PAIN: 0
WHEEZING: 0
ABDOMINAL DISTENTION: 0

## 2023-10-24 NOTE — PROGRESS NOTES
General: Skin is warm and dry. Coloration: Skin is not pale. Findings: No erythema or rash. Neurological:      Mental Status: He is alert and oriented to person, place, and time. Cranial Nerves: No cranial nerve deficit. Psychiatric:         Behavior: Behavior normal.         Thought Content: Thought content normal.         Judgment: Judgment normal.       Lab Results   Component Value Date    WBC 6.5 07/06/2023    HGB 17.3 07/06/2023    HCT 51.8 07/06/2023    MCV 94.4 (H) 07/06/2023     07/06/2023     Lab Results   Component Value Date     10/27/2022    K 4.7 10/27/2022     10/27/2022    CO2 27 10/27/2022     Lab Results   Component Value Date    CREATININE 0.8 10/27/2022     Lab Results   Component Value Date    ALT 42 10/27/2022    AST 24 10/27/2022    ALKPHOS 97 10/27/2022    BILITOT 0.4 10/27/2022     Lab Results   Component Value Date    LIPASE 18.2 10/27/2022       Patient Active Problem List   Diagnosis    Nephrolithiasis    Hydronephrosis    Kidney stone on left side    Ureteral stricture, left    Kidney stone    Benign prostatic hyperplasia    Renal stones    Right inguinal hernia    Umbilical hernia without obstruction and without gangrene    Gastroesophageal reflux disease without esophagitis    Dependent edema    Dyslipidemia    Other intestinal malabsorption    Elevated lipids    Nocturia     Procedure Note:    Preoperative Diagnosis: Abdominal wall/umbilical stitch granuloma  Postoperative Diagnosis: Same  Operation: Excision abdominal wall/umbilical stitch granuloma  Surgeon:  Dr. Sherren Ferry  Anesthesia: Local -Xylocaine  Complications: none  Indication of Procedure: As above  Procedure: Delaney Odom was taken back to the procedure room. Consent obtained. Timeout occurred. All questions answered. He wished to proceed. Placed supine on procedure room table.   Tolerated local anesthetic well after the area of concern was prepped and draped in usual sterile standard

## 2023-11-07 ENCOUNTER — TELEPHONE (OUTPATIENT)
Dept: FAMILY MEDICINE CLINIC | Age: 68
End: 2023-11-07

## 2023-11-07 DIAGNOSIS — Z71.89 ACP (ADVANCE CARE PLANNING): Primary | ICD-10-CM

## 2023-11-07 NOTE — TELEPHONE ENCOUNTER
This is in fu from last visit  Does pt have any interest in filling out living will or Durable poa for healthcare?  If so I can place referral to ACP specialist.  Thank you

## 2023-11-07 NOTE — TELEPHONE ENCOUNTER
Called patient to ask if he was interested in a living will or durable POA. Patient states he is interested, has been so busy that he forgot to get this done. Would like a referral placed.    Please advise, thank you

## 2023-11-08 ENCOUNTER — CLINICAL DOCUMENTATION (OUTPATIENT)
Dept: SPIRITUAL SERVICES | Age: 68
End: 2023-11-08

## 2023-11-08 NOTE — ACP (ADVANCE CARE PLANNING)
(Specify) : Outcomes:                [] 3rd -  Date:                Intervention:  [] Spoke with Patient   [] Left Voice mail [] Email / Mail    [] MyChart  [] Other [id number] : Outcomes:           []  Additional Outreach -  Date:     (Specify Dates & special circumstances): Outcomes:          Thank you for this referral.

## 2024-01-04 ENCOUNTER — OFFICE VISIT (OUTPATIENT)
Dept: FAMILY MEDICINE CLINIC | Age: 69
End: 2024-01-04
Payer: MEDICARE

## 2024-01-04 ENCOUNTER — NURSE ONLY (OUTPATIENT)
Dept: LAB | Age: 69
End: 2024-01-04

## 2024-01-04 VITALS
TEMPERATURE: 97.3 F | BODY MASS INDEX: 31.95 KG/M2 | RESPIRATION RATE: 14 BRPM | OXYGEN SATURATION: 97 % | SYSTOLIC BLOOD PRESSURE: 122 MMHG | WEIGHT: 223.2 LBS | DIASTOLIC BLOOD PRESSURE: 74 MMHG | HEART RATE: 66 BPM | HEIGHT: 70 IN

## 2024-01-04 DIAGNOSIS — R89.9 ABNORMAL LABORATORY TEST: Primary | ICD-10-CM

## 2024-01-04 DIAGNOSIS — R89.9 ABNORMAL LABORATORY TEST: ICD-10-CM

## 2024-01-04 DIAGNOSIS — Z23 NEED FOR VACCINATION: ICD-10-CM

## 2024-01-04 DIAGNOSIS — Z11.59 NEED FOR HEPATITIS B SCREENING TEST: ICD-10-CM

## 2024-01-04 LAB
HBV CORE IGM SERPL QL IA: NEGATIVE
HBV SURFACE AB SER QL IA: POSITIVE
HBV SURFACE AG SERPL QL IA: NEGATIVE

## 2024-01-04 PROCEDURE — G8427 DOCREV CUR MEDS BY ELIG CLIN: HCPCS | Performed by: NURSE PRACTITIONER

## 2024-01-04 PROCEDURE — 1036F TOBACCO NON-USER: CPT | Performed by: NURSE PRACTITIONER

## 2024-01-04 PROCEDURE — 99213 OFFICE O/P EST LOW 20 MIN: CPT | Performed by: NURSE PRACTITIONER

## 2024-01-04 PROCEDURE — G8417 CALC BMI ABV UP PARAM F/U: HCPCS | Performed by: NURSE PRACTITIONER

## 2024-01-04 PROCEDURE — 1123F ACP DISCUSS/DSCN MKR DOCD: CPT | Performed by: NURSE PRACTITIONER

## 2024-01-04 PROCEDURE — 90471 IMMUNIZATION ADMIN: CPT | Performed by: NURSE PRACTITIONER

## 2024-01-04 PROCEDURE — G8484 FLU IMMUNIZE NO ADMIN: HCPCS | Performed by: NURSE PRACTITIONER

## 2024-01-04 PROCEDURE — 90715 TDAP VACCINE 7 YRS/> IM: CPT | Performed by: NURSE PRACTITIONER

## 2024-01-04 PROCEDURE — 3017F COLORECTAL CA SCREEN DOC REV: CPT | Performed by: NURSE PRACTITIONER

## 2024-01-04 ASSESSMENT — PATIENT HEALTH QUESTIONNAIRE - PHQ9
1. LITTLE INTEREST OR PLEASURE IN DOING THINGS: 0
SUM OF ALL RESPONSES TO PHQ QUESTIONS 1-9: 1
SUM OF ALL RESPONSES TO PHQ9 QUESTIONS 1 & 2: 1
SUM OF ALL RESPONSES TO PHQ QUESTIONS 1-9: 1
2. FEELING DOWN, DEPRESSED OR HOPELESS: 1
SUM OF ALL RESPONSES TO PHQ QUESTIONS 1-9: 1
SUM OF ALL RESPONSES TO PHQ QUESTIONS 1-9: 1

## 2024-01-04 NOTE — PROGRESS NOTES
Jorge L Uribe is a 68 y.o. male thatpresents for Other (Pt states that he went to give blood and he had a positive Hep B test, and then took more and it was negative)      History obtained today from Patient.    HPI    Abnormal lab    It was advised by another provider to give blood so went to [facility] and after testing it showed he has + Hep B Core Antibody. They did further blood work, and hep B surface antigen was negative as well as PILO.   He has no knowledge of being vaccinated for Hep B  No symptoms   No knowledge of being exposed to Hep B in the past.      Denies any abdominal pain, nausea/vomiting or skin color changes    I have reviewed the patient's past medical history, past surgical history, allergies,medications, social and family history and I have made updates where appropriate.    Past Medical History:   Diagnosis Date    GERD (gastroesophageal reflux disease)     Hyperlipidemia     BRENDA on CPAP     Other hyperlipidemia        Social History     Tobacco Use    Smoking status: Never    Smokeless tobacco: Never   Substance Use Topics    Alcohol use: Yes     Comment: I might have a beer or two a month    Drug use: No       Family History   Problem Relation Age of Onset    Sleep Apnea Father          Review of Systems      Review of Systems    Constitutional: Negative for Fever, Chills, Fatigue  Cardiovascular:  Negative forChest Pain, Palpitations  Respiratory:  Negative for Cough, Wheezing, Shortness of breath  Gastrointestinal:  Nausea, Vomiting, Diarrhea, Constipation, Blood in stool  Genitourinary:  Negative for Difficulty or painful urination,Change in frequency, Urgency  Skin:  Negative for Color change, Rash, Itching, Wound  Psychiatric:  Negative forAnxiety, Depression, Suicidal ideation  Musculoskeletal:  Negative for Joint pain, Back pain, Gait problems  Neurological:  Negative for Dizziness, Headaches    PHYSICAL EXAM:  /74   Pulse 66   Temp 97.3 °F (36.3 °C) (Temporal)   Resp 14

## 2024-01-05 ENCOUNTER — TELEPHONE (OUTPATIENT)
Dept: FAMILY MEDICINE CLINIC | Age: 69
End: 2024-01-05

## 2024-01-05 LAB — HBV CORE IGG+IGM SERPL QL IA: REACTIVE

## 2024-01-05 NOTE — TELEPHONE ENCOUNTER
----- Message from Joan Suarez, OLGA - CNP sent at 1/5/2024  7:02 AM EST -----  So far looks like labs just showed he has immunity from Hep b  Waiting on another lab result yet.

## 2024-01-05 NOTE — TELEPHONE ENCOUNTER
----- Message from OLGA Betts - CNP sent at 1/4/2024  1:32 PM EST -----  Can we see if pt has records from Dr pSain office, regarding vaccine records  Hep B vaccine in particular

## 2024-01-08 ENCOUNTER — TELEPHONE (OUTPATIENT)
Dept: FAMILY MEDICINE CLINIC | Age: 69
End: 2024-01-08

## 2024-01-08 NOTE — TELEPHONE ENCOUNTER
----- Message from Joan Suarez, OLGA - CNP sent at 1/6/2024  3:01 PM EST -----  Labs reviewed and indicates that he has had previous hep b infection in the past but has cleared it and is now immune to it, so should not need vaccinated.

## 2024-09-09 SDOH — ECONOMIC STABILITY: TRANSPORTATION INSECURITY
IN THE PAST 12 MONTHS, HAS LACK OF TRANSPORTATION KEPT YOU FROM MEETINGS, WORK, OR FROM GETTING THINGS NEEDED FOR DAILY LIVING?: NO

## 2024-09-09 SDOH — ECONOMIC STABILITY: FOOD INSECURITY: WITHIN THE PAST 12 MONTHS, YOU WORRIED THAT YOUR FOOD WOULD RUN OUT BEFORE YOU GOT MONEY TO BUY MORE.: SOMETIMES TRUE

## 2024-09-09 SDOH — HEALTH STABILITY: PHYSICAL HEALTH: ON AVERAGE, HOW MANY DAYS PER WEEK DO YOU ENGAGE IN MODERATE TO STRENUOUS EXERCISE (LIKE A BRISK WALK)?: 0 DAYS

## 2024-09-09 SDOH — ECONOMIC STABILITY: INCOME INSECURITY: HOW HARD IS IT FOR YOU TO PAY FOR THE VERY BASICS LIKE FOOD, HOUSING, MEDICAL CARE, AND HEATING?: SOMEWHAT HARD

## 2024-09-09 SDOH — ECONOMIC STABILITY: FOOD INSECURITY: WITHIN THE PAST 12 MONTHS, THE FOOD YOU BOUGHT JUST DIDN'T LAST AND YOU DIDN'T HAVE MONEY TO GET MORE.: PATIENT DECLINED

## 2024-09-09 ASSESSMENT — LIFESTYLE VARIABLES
HOW OFTEN DO YOU HAVE SIX OR MORE DRINKS ON ONE OCCASION: 1
HOW OFTEN DO YOU HAVE A DRINK CONTAINING ALCOHOL: MONTHLY OR LESS
HOW MANY STANDARD DRINKS CONTAINING ALCOHOL DO YOU HAVE ON A TYPICAL DAY: 1 OR 2
HOW OFTEN DO YOU HAVE A DRINK CONTAINING ALCOHOL: 2
HOW MANY STANDARD DRINKS CONTAINING ALCOHOL DO YOU HAVE ON A TYPICAL DAY: 1

## 2024-09-09 ASSESSMENT — PATIENT HEALTH QUESTIONNAIRE - PHQ9
SUM OF ALL RESPONSES TO PHQ QUESTIONS 1-9: 0
SUM OF ALL RESPONSES TO PHQ QUESTIONS 1-9: 0
2. FEELING DOWN, DEPRESSED OR HOPELESS: NOT AT ALL
SUM OF ALL RESPONSES TO PHQ QUESTIONS 1-9: 0
SUM OF ALL RESPONSES TO PHQ9 QUESTIONS 1 & 2: 0
SUM OF ALL RESPONSES TO PHQ QUESTIONS 1-9: 0
1. LITTLE INTEREST OR PLEASURE IN DOING THINGS: NOT AT ALL

## 2024-09-10 ENCOUNTER — OFFICE VISIT (OUTPATIENT)
Dept: FAMILY MEDICINE CLINIC | Age: 69
End: 2024-09-10
Payer: MEDICARE

## 2024-09-10 VITALS
TEMPERATURE: 98.3 F | WEIGHT: 215.8 LBS | HEART RATE: 88 BPM | SYSTOLIC BLOOD PRESSURE: 126 MMHG | BODY MASS INDEX: 30.9 KG/M2 | DIASTOLIC BLOOD PRESSURE: 72 MMHG | HEIGHT: 70 IN | OXYGEN SATURATION: 96 % | RESPIRATION RATE: 14 BRPM

## 2024-09-10 DIAGNOSIS — E78.2 MIXED HYPERLIPIDEMIA: ICD-10-CM

## 2024-09-10 DIAGNOSIS — M25.512 CHRONIC LEFT SHOULDER PAIN: ICD-10-CM

## 2024-09-10 DIAGNOSIS — G89.29 CHRONIC LEFT SHOULDER PAIN: ICD-10-CM

## 2024-09-10 DIAGNOSIS — Z00.00 MEDICARE ANNUAL WELLNESS VISIT, SUBSEQUENT: Primary | ICD-10-CM

## 2024-09-10 PROCEDURE — 3017F COLORECTAL CA SCREEN DOC REV: CPT | Performed by: NURSE PRACTITIONER

## 2024-09-10 PROCEDURE — G0439 PPPS, SUBSEQ VISIT: HCPCS | Performed by: NURSE PRACTITIONER

## 2024-09-10 PROCEDURE — 1123F ACP DISCUSS/DSCN MKR DOCD: CPT | Performed by: NURSE PRACTITIONER

## 2024-09-25 ENCOUNTER — HOSPITAL ENCOUNTER (OUTPATIENT)
Dept: OCCUPATIONAL THERAPY | Age: 69
Setting detail: THERAPIES SERIES
Discharge: HOME | End: 2024-09-25
Payer: MEDICARE

## 2024-09-25 PROCEDURE — 97110 THERAPEUTIC EXERCISES: CPT

## 2024-09-25 PROCEDURE — 97165 OT EVAL LOW COMPLEX 30 MIN: CPT

## 2024-10-02 ENCOUNTER — HOSPITAL ENCOUNTER (OUTPATIENT)
Dept: OCCUPATIONAL THERAPY | Age: 69
Setting detail: THERAPIES SERIES
Discharge: HOME | End: 2024-10-02
Payer: MEDICARE

## 2024-10-02 PROCEDURE — 97110 THERAPEUTIC EXERCISES: CPT

## 2024-10-02 NOTE — PROGRESS NOTES
[facility]  OCCUPATIONAL THERAPY  [] EVALUATION  [x] DAILY NOTE (LAND) [] DAILY NOTE (AQUATIC ) [] PROGRESS NOTE [] DISCHARGE NOTE    [] OUTPATIENT REHABILITATION [facility]   [] [place] AM[facility]ULATORY CARE [place]    [] [facility]   [x] LINDA [facility]    Date: 10/2/2024  Patient Name:  Hubert Uribe  : 1955  MRN: [id number]  CSN: [id number]    Referring Practitioner Joan Suarez, APRN - CNP [phone]      Diagnosis Pain in left shoulder  Other chronic pain   Treatment Diagnosis R53.1 Weakness  M79.602  Left Arm Pain   Date of Evaluation 24   Additional Pertinent History Hubert Uribe has a past medical history of GERD (gastroesophageal reflux disease), Hyperlipidemia, BRENDA on CPAP, and Other hyperlipidemia.  he has a past surgical history that includes Cystoscopy (Left, 2015); Appendectomy (??); Tonsillectomy; Lithotripsy (Left, 2016); Colonoscopy (2016); hernia repair (10/04/2016); and Colonoscopy (2023).     Allergies No Known Allergies   Medications   Current Outpatient Medications:     Magnesium (CVS TRIPLE MAGNESIUM COMPLEX) 400 MG CAPS, Take 1 capsule by mouth 2 times daily (with meals) As dessert, Disp: , Rfl:     NONFORMULARY, Take 1 capsule by mouth every evening MAGTEIN-by Source Natural, Disp: , Rfl:     Omega-3 1000 MG CAPS, Take 3 capsules by mouth 4 times daily (before meals and nightly) [facility] omega, Disp: , Rfl:     DHEA 10 MG TABS, Take by mouth daily (with breakfast) [facility], Disp: , Rfl:     B Complex-Biotin-FA (COMPLEX B-50 PROLONGED RELEASE PO), Take 100 mg by mouth 2 times daily (before meals) B-100, Disp: , Rfl:     CINNAMON PO, Take 1,500 mg by mouth 4 times daily (before meals and nightly) [place] not extracts.  It has chromium, Disp: , Rfl:     Cholecalciferol (VITAMIN D3) 25 MCG (1000 UT) CAPS, Take 1,000 Units by mouth Daily Skip Sundays, Disp: , Rfl:     omeprazole (PRILOSEC) 20 MG

## 2024-10-03 ENCOUNTER — HOSPITAL ENCOUNTER (OUTPATIENT)
Dept: OCCUPATIONAL THERAPY | Age: 69
Setting detail: THERAPIES SERIES
Discharge: HOME | End: 2024-10-03
Payer: MEDICARE

## 2024-10-03 PROCEDURE — 97110 THERAPEUTIC EXERCISES: CPT

## 2024-10-03 NOTE — PROGRESS NOTES
[facility]  OCCUPATIONAL THERAPY  [] EVALUATION  [x] DAILY NOTE (LAND) [] DAILY NOTE (AQUATIC ) [] PROGRESS NOTE [] DISCHARGE NOTE    [] OUTPATIENT REHABILITATION [facility]   [] [place] AMBULATORY CARE [place]    [] [facility]   [x] LINDA [facility]    Date: 10/3/2024  Patient Name:  Hubert Uribe  : 1955  MRN: [id number]  CSN: [id number]    Referring Practitioner Joan Suarez, APRN - CNP [id number]      Diagnosis Pain in left shoulder  Other chronic pain   Treatment Diagnosis R53.1 Weakness  M79.602  Left Arm Pain   Date of Evaluation 24   Additional Pertinent History Hubert Uribe has a past medical history of GERD (gastroesophageal reflux disease), Hyperlipidemia, BRENDA on CPAP, and Other hyperlipidemia.  he has a past surgical history that includes Cystoscopy (Left, 2015); Appendectomy (??); Tonsillectomy; Lithotripsy (Left, 2016); Colonoscopy (2016); hernia repair (10/04/2016); and Colonoscopy (2023).     Allergies No Known Allergies   Medications   Current Outpatient Medications:     Magnesium (CVS TRIPLE MAGNESIUM COMPLEX) 400 MG CAPS, Take 1 capsule by mouth 2 times daily (with meals) As dessert, Disp: , Rfl:     NONFORMULARY, Take 1 capsule by mouth every evening MAGTEIN-[employer], Disp: , Rfl:     Omega-3 1000 MG CAPS, Take 3 capsules by mouth 4 times daily (before meals and nightly) [facility] omega, Disp: , Rfl:     DHEA 10 MG TABS, Take by mouth daily (with breakfast) [facility], Disp: , Rfl:     B Complex-Biotin-FA (COMPLEX B-50 PROLONGED RELEASE PO), Take 100 mg by mouth 2 times daily (before meals) B-100, Disp: , Rfl:     CINNAMON PO, Take 1,500 mg by mouth 4 times daily (before meals and nightly) [place] not extracts.  It has chromium, Disp: , Rfl:     Cholecalciferol (VITAMIN D3) 25 MCG (1000 UT) CAPS, Take 1,000 Units by mouth Daily Skip Sundays, Disp: , Rfl:     omeprazole (PRILOSEC) 20 MG

## 2024-10-07 ENCOUNTER — HOSPITAL ENCOUNTER (OUTPATIENT)
Dept: OCCUPATIONAL THERAPY | Age: 69
Setting detail: THERAPIES SERIES
Discharge: HOME | End: 2024-10-07
Payer: MEDICARE

## 2024-10-07 PROCEDURE — 97110 THERAPEUTIC EXERCISES: CPT

## 2024-10-07 PROCEDURE — 97140 MANUAL THERAPY 1/> REGIONS: CPT

## 2024-10-07 NOTE — PROGRESS NOTES
[facility]  OCCUPATIONAL THERAPY  [] EVALUATION  [x] DAILY NOTE (LAND) [] DAILY NOTE (AQUATIC ) [] PROGRESS NOTE [] DISCHARGE NOTE    [] OUTPATIENT REHABILITATION [facility]   [] [place] AMBULATORY CARE [place]    [] [facility]   [x] LINDA [facility]    Date: 10/7/2024  Patient Name:  Hubert Uribe  : 1955  MRN: [id number]  CSN: [id number]    Referring Practitioner Joan Suarez, APRN - CNP [id number]      Diagnosis Pain in left shoulder  Other chronic pain   Treatment Diagnosis R53.1 Weakness  M79.602  Left Arm Pain   Date of Evaluation 24   Additional Pertinent History Hubert Uribe has a past medical history of GERD (gastroesophageal reflux disease), Hyperlipidemia, BRENDA on CPAP, and Other hyperlipidemia.  he has a past surgical history that includes Cystoscopy (Left, 2015); Appendectomy (??); Tonsillectomy; Lithotripsy (Left, 2016); Colonoscopy (2016); hernia repair (10/04/2016); and Colonoscopy (2023).     Allergies No Known Allergies   Medications   Current Outpatient Medications:     Magnesium (CVS TRIPLE MAGNESIUM COMPLEX) 400 MG CAPS, Take 1 capsule by mouth 2 times daily (with meals) As dessert, Disp: , Rfl:     NONFORMULARY, Take 1 capsule by mouth every evening MAGTEIN-by [employer] Natural, Disp: , Rfl:     Omega-3 1000 MG CAPS, Take 3 capsules by mouth 4 times daily (before meals and nightly) [facility] omega, Disp: , Rfl:     DHEA 10 MG TABS, Take by mouth daily (with breakfast) [facility], Disp: , Rfl:     B Complex-Biotin-FA (COMPLEX B-50 PROLONGED RELEASE PO), Take 100 mg by mouth 2 times daily (before meals) B-100, Disp: , Rfl:     CINNAMON PO, Take 1,500 mg by mouth 4 times daily (before meals and nightly) [place] not extracts.  It has chromium, Disp: , Rfl:     Cholecalciferol (VITAMIN D3) 25 MCG (1000 UT) CAPS, Take 1,000 Units by mouth Daily Skip Sundays, Disp: , Rfl:     omeprazole (PRILOSEC) 20 MG

## 2024-10-09 ENCOUNTER — OFFICE VISIT (OUTPATIENT)
Dept: SURGERY | Age: 69
End: 2024-10-09
Payer: MEDICARE

## 2024-10-09 VITALS
HEIGHT: 70 IN | DIASTOLIC BLOOD PRESSURE: 68 MMHG | TEMPERATURE: 97.5 F | BODY MASS INDEX: 30.88 KG/M2 | HEART RATE: 60 BPM | OXYGEN SATURATION: 98 % | SYSTOLIC BLOOD PRESSURE: 116 MMHG | WEIGHT: 215.7 LBS

## 2024-10-09 DIAGNOSIS — T81.89XA SUTURE GRANULOMA, INITIAL ENCOUNTER: Primary | ICD-10-CM

## 2024-10-09 PROCEDURE — G8484 FLU IMMUNIZE NO ADMIN: HCPCS | Performed by: SURGERY

## 2024-10-09 PROCEDURE — 3017F COLORECTAL CA SCREEN DOC REV: CPT | Performed by: SURGERY

## 2024-10-09 PROCEDURE — G8417 CALC BMI ABV UP PARAM F/U: HCPCS | Performed by: SURGERY

## 2024-10-09 PROCEDURE — 1036F TOBACCO NON-USER: CPT | Performed by: SURGERY

## 2024-10-09 PROCEDURE — 99213 OFFICE O/P EST LOW 20 MIN: CPT | Performed by: SURGERY

## 2024-10-09 PROCEDURE — G8427 DOCREV CUR MEDS BY ELIG CLIN: HCPCS | Performed by: SURGERY

## 2024-10-09 PROCEDURE — 1123F ACP DISCUSS/DSCN MKR DOCD: CPT | Performed by: SURGERY

## 2024-10-10 ENCOUNTER — APPOINTMENT (OUTPATIENT)
Dept: OCCUPATIONAL THERAPY | Age: 69
End: 2024-10-10
Payer: MEDICARE

## 2024-10-14 ENCOUNTER — HOSPITAL ENCOUNTER (OUTPATIENT)
Dept: OCCUPATIONAL THERAPY | Age: 69
Setting detail: THERAPIES SERIES
Discharge: HOME | End: 2024-10-14
Payer: MEDICARE

## 2024-10-14 PROCEDURE — 97140 MANUAL THERAPY 1/> REGIONS: CPT

## 2024-10-14 PROCEDURE — 97110 THERAPEUTIC EXERCISES: CPT

## 2024-10-14 NOTE — PROGRESS NOTES
[facility]  OCCUPATIONAL THERAPY  [] EVALUATION  [x] DAILY NOTE (LAND) [] DAILY NOTE (AQUATIC ) [] PROGRESS NOTE [] DISCHARGE NOTE    [] OUTPATIENT REHABILITATION [facility]   [] [place] AMBULATORY CARE [place]    [] [facility]   [x] LINDA [facility]    Date: 10/14/2024  Patient Name:  Hubert Uribe  : 1955  MRN: [id number]  CSN: [id number]    Referring Practitioner Joan Suarez, APRN - CNP [id number]      Diagnosis Pain in left shoulder  Other chronic pain   Treatment Diagnosis R53.1 Weakness  M79.602  Left Arm Pain   Date of Evaluation 24   Additional Pertinent History Hubert Uribe has a past medical history of GERD (gastroesophageal reflux disease), Hyperlipidemia, BRENDA on CPAP, and Other hyperlipidemia.  he has a past surgical history that includes Cystoscopy (Left, 2015); Appendectomy (??); Tonsillectomy; Lithotripsy (Left, 2016); Colonoscopy (2016); hernia repair (10/04/2016); and Colonoscopy (2023).     Allergies No Known Allergies   Medications   Current Outpatient Medications:     Magnesium (CVS TRIPLE MAGNESIUM COMPLEX) 400 MG CAPS, Take 1 capsule by mouth 2 times daily (with meals) As dessert, Disp: , Rfl:     NONFORMULARY, Take 1 capsule by mouth every evening MAGTEIN-by [employer] Natural, Disp: , Rfl:     Omega-3 1000 MG CAPS, Take 3 capsules by mouth 4 times daily (before meals and nightly) [facility] omega, Disp: , Rfl:     DHEA 10 MG TABS, Take by mouth daily (with breakfast) [facility], Disp: , Rfl:     B Complex-Biotin-FA (COMPLEX B-50 PROLONGED RELEASE PO), Take 100 mg by mouth 2 times daily (before meals) B-100, Disp: , Rfl:     CINNAMON PO, Take 1,500 mg by mouth 4 times daily (before meals and nightly) [place] not extracts.  It has chromium, Disp: , Rfl:     Cholecalciferol (VITAMIN D3) 25 MCG (1000 UT) CAPS, Take 1 capsule by mouth Daily Skip Sundays, Disp: , Rfl:     omeprazole (PRILOSEC) 20 MG delayed

## 2024-10-16 ENCOUNTER — HOSPITAL ENCOUNTER (OUTPATIENT)
Dept: OCCUPATIONAL THERAPY | Age: 69
Setting detail: THERAPIES SERIES
Discharge: HOME | End: 2024-10-16
Payer: MEDICARE

## 2024-10-16 PROCEDURE — 97140 MANUAL THERAPY 1/> REGIONS: CPT

## 2024-10-16 PROCEDURE — 97110 THERAPEUTIC EXERCISES: CPT

## 2024-10-16 NOTE — PROGRESS NOTES
[facility]  OCCUPATIONAL THERAPY  [] EVALUATION  [x] DAILY NOTE (LAND) [] DAILY NOTE (AQUATIC ) [] PROGRESS NOTE [] DISCHARGE NOTE    [] OUTPATIENT REHABILITATION [facility]   [] [place] AMBULATORY CARE [place]    [] [facility]   [x] LINDA [facility]    Date: 10/16/2024  Patient Name:  Hubert Uribe  : 1955  MRN: [id number]  CSN: [id number]    Referring Practitioner Joan Suarez, APRN - CNP [id number]      Diagnosis Pain in left shoulder  Other chronic pain   Treatment Diagnosis R53.1 Weakness  M79.602  Left Arm Pain   Date of Evaluation 24   Additional Pertinent History Hubert Uribe has a past medical history of GERD (gastroesophageal reflux disease), Hyperlipidemia, BRENDA on CPAP, and Other hyperlipidemia.  he has a past surgical history that includes Cystoscopy (Left, 2015); Appendectomy (??); Tonsillectomy; Lithotripsy (Left, 2016); Colonoscopy (2016); hernia repair (10/04/2016); and Colonoscopy (2023).     Allergies No Known Allergies   Medications   Current Outpatient Medications:     Magnesium (CVS TRIPLE MAGNESIUM COMPLEX) 400 MG CAPS, Take 1 capsule by mouth 2 times daily (with meals) As dessert, Disp: , Rfl:     NONFORMULARY, Take 1 capsule by mouth every evening MAGTEIN-by [employer] Natural, Disp: , Rfl:     Omega-3 1000 MG CAPS, Take 3 capsules by mouth 4 times daily (before meals and nightly) [facility] omega, Disp: , Rfl:     DHEA 10 MG TABS, Take by mouth daily (with breakfast) [facility], Disp: , Rfl:     B Complex-Biotin-FA (COMPLEX B-50 PROLONGED RELEASE PO), Take 100 mg by mouth 2 times daily (before meals) B-100, Disp: , Rfl:     CINNAMON PO, Take 1,500 mg by mouth 4 times daily (before meals and nightly) [place] not extracts.  It has chromium, Disp: , Rfl:     Cholecalciferol (VITAMIN D3) 25 MCG (1000 UT) CAPS, Take 1 capsule by mouth Daily Skip Sundays, Disp: , Rfl:     omeprazole (PRILOSEC) 20 MG delayed

## 2024-10-21 ENCOUNTER — HOSPITAL ENCOUNTER (OUTPATIENT)
Dept: OCCUPATIONAL THERAPY | Age: 69
Setting detail: THERAPIES SERIES
Discharge: HOME | End: 2024-10-21
Payer: MEDICARE

## 2024-10-21 PROCEDURE — 97110 THERAPEUTIC EXERCISES: CPT

## 2024-10-21 PROCEDURE — 97140 MANUAL THERAPY 1/> REGIONS: CPT

## 2024-10-21 NOTE — PROGRESS NOTES
[facility]  OCCUPATIONAL THERAPY  [] EVALUATION  [x] DAILY NOTE (LAND) [] DAILY NOTE (AQUATIC ) [] PROGRESS NOTE [] DISCHARGE NOTE    [] OUTPATIENT REHABILITATION [facility]   [] [facility] [place]    [] [facility]   [x] [facility]    Date: 10/21/2024  Patient Name:  Hubert Uribe  : 1955  MRN: [id number]  CSN: [id number]    Referring Practitioner Joan Suarez, APRN - CNP [id number]      Diagnosis Pain in left shoulder  Other chronic pain   Treatment Diagnosis R53.1 Weakness  M79.602  Left Arm Pain   Date of Evaluation 24   Additional Pertinent History Hubert Uribe has a past medical history of GERD (gastroesophageal reflux disease), Hyperlipidemia, BRENDA on CPAP, and Other hyperlipidemia.  he has a past surgical history that includes Cystoscopy (Left, 2015); Appendectomy (??); Tonsillectomy; Lithotripsy (Left, 2016); Colonoscopy (2016); hernia repair (10/04/2016); and Colonoscopy (2023).     Allergies No Known Allergies   Medications   Current Outpatient Medications:     Magnesium ([employer] TRIPLE MAGNESIUM COMPLEX) 400 MG CAPS, Take 1 capsule by mouth 2 times daily (with meals) As dessert, Disp: , Rfl:     NONFORMULARY, Take 1 capsule by mouth every evening MAGTEIN-by Source Natural, Disp: , Rfl:     Omega-3 1000 MG CAPS, Take 3 capsules by mouth 4 times daily (before meals and nightly) [facility] omega, Disp: , Rfl:     DHEA 10 MG TABS, Take by mouth daily (with breakfast) [facility], Disp: , Rfl:     B Complex-Biotin-FA (COMPLEX B-50 PROLONGED RELEASE PO), Take 100 mg by mouth 2 times daily (before meals) B-100, Disp: , Rfl:     CINNAMON PO, Take 1,500 mg by mouth 4 times daily (before meals and nightly) [place] not extracts.  It has chromium, Disp: , Rfl:     Cholecalciferol (VITAMIN D3) 25 MCG (1000 UT) CAPS, Take 1 capsule by mouth Daily Skip Sundays, Disp: , Rfl:     omeprazole (PRILOSEC) 20 MG delayed

## 2024-10-23 ENCOUNTER — HOSPITAL ENCOUNTER (OUTPATIENT)
Dept: OCCUPATIONAL THERAPY | Age: 69
Setting detail: THERAPIES SERIES
Discharge: HOME | End: 2024-10-23
Payer: MEDICARE

## 2024-10-23 PROCEDURE — 97112 NEUROMUSCULAR REEDUCATION: CPT

## 2024-10-23 PROCEDURE — 97110 THERAPEUTIC EXERCISES: CPT

## 2024-10-23 NOTE — PROGRESS NOTES
WFL without pain. GOAL NOT MET- min pain with end range ER and discomfort in abduction, overall less pain than initially     Long Term Goals:  Time Frame: 12 weeks  -Hubert will demonstrate 5/5 MMT L shoulder for increased ability to lift heavy objects with L UE GOAL NOT MET flex 5/5, abd 4+/5 with min discomfort, ER 4+/5 with pain, IR 5/5 no pain,   -Hubert will report ability to lift 30# crate with B UE from floor to mat table and 10 lb dumbbell with L UE only with 0/10 pain for increased independence with his hobbies (repair work, will be replacing a roof in the future, etc). GOAL MET for 30 lb crate, NOT met for 10 lb overhead without pain, able to complete but some discomfort  REV    Patient Education:   []  HEP/Education Completed: Plan of Care, Goals, HEP  Massive Damage Access Code for HEP:   Access Code: [email or web address]  - Standing Shoulder Row with Anchored Resistance  - 1 x daily - 7 x weekly - 3 sets - 10 reps  - Shoulder extension with resistance - Neutral  - 1 x daily - 7 x weekly - 3 sets - 10 reps  - Shoulder Internal Rotation with Resistance  - 1 x daily - 7 x weekly - 3 sets - 10 reps  - Shoulder External Rotation with Anchored Resistance  - 1 x daily - 7 x weekly - 3 sets - 10 reps  - Standing Shoulder Flexion with Resistance  - 1 x daily - 7 x weekly - 3 sets - 10 reps  - Standing Single Arm Elbow Flexion with Resistance  - 1 x daily - 7 x weekly - 3 sets - 10 reps  10/2/24: orange band tricep, standing scaption, abduction, horizontal abduction, corner stretch   10/3/24: prone row, extension, D2 diagonal   10/7: green band for HEP  10/21: supine SA punch, circles, flexion to tolerance, wall slide with green band   []  No new Education completed  [x]  Reviewed Prior HEP      [x]  Patient verbalized and/or demonstrated understanding of education provided.  []  Patient unable to verbalize and/or demonstrate understanding of education provided.  Will continue education.  [x]  Barriers to learning:

## 2024-10-30 ENCOUNTER — PROCEDURE VISIT (OUTPATIENT)
Dept: SURGERY | Age: 69
End: 2024-10-30

## 2024-10-30 VITALS
DIASTOLIC BLOOD PRESSURE: 76 MMHG | BODY MASS INDEX: 30.78 KG/M2 | HEIGHT: 70 IN | WEIGHT: 215 LBS | OXYGEN SATURATION: 97 % | TEMPERATURE: 97.4 F | SYSTOLIC BLOOD PRESSURE: 114 MMHG | RESPIRATION RATE: 18 BRPM | HEART RATE: 67 BPM

## 2024-10-30 DIAGNOSIS — T81.89XA SUTURE GRANULOMA, INITIAL ENCOUNTER: Primary | ICD-10-CM

## 2024-10-31 ASSESSMENT — ENCOUNTER SYMPTOMS
COLOR CHANGE: 0
EYE PAIN: 0
EYE DISCHARGE: 0
EYE REDNESS: 0
SHORTNESS OF BREATH: 0
COUGH: 0
PHOTOPHOBIA: 0
BACK PAIN: 0
SINUS PRESSURE: 0
DIARRHEA: 0
BLOOD IN STOOL: 0
APNEA: 0
CHEST TIGHTNESS: 0
STRIDOR: 0
ANAL BLEEDING: 0
ABDOMINAL DISTENTION: 0
RECTAL PAIN: 0
RHINORRHEA: 0
ABDOMINAL PAIN: 0
NAUSEA: 0
FACIAL SWELLING: 0
EYE ITCHING: 0
VOMITING: 0
VOICE CHANGE: 0
SORE THROAT: 0
CONSTIPATION: 0
ALLERGIC/IMMUNOLOGIC NEGATIVE: 1
WHEEZING: 0
CHOKING: 0
TROUBLE SWALLOWING: 0

## 2024-10-31 NOTE — PROGRESS NOTES
tenderness. Normal range of motion.      Cervical back: Normal range of motion and neck supple.   Skin:     General: Skin is warm and dry.      Coloration: Skin is not pale.      Findings: No erythema or rash.   Neurological:      Mental Status: He is alert and oriented to person, place, and time.      Cranial Nerves: No cranial nerve deficit.   Psychiatric:         Behavior: Behavior normal.         Thought Content: Thought content normal.         Judgment: Judgment normal.       Lab Results   Component Value Date    WBC 6.9 12/19/2023    HGB 16.2 12/19/2023    HCT 48.8 12/19/2023    MCV 94.9 (H) 12/19/2023     12/19/2023     Lab Results   Component Value Date     12/19/2023    K 4.3 12/19/2023     12/19/2023    CO2 28 12/19/2023     Lab Results   Component Value Date    CREATININE 0.9 12/19/2023     Lab Results   Component Value Date    ALT 44 12/19/2023    AST 30 12/19/2023    ALKPHOS 74 12/19/2023    BILITOT 0.6 12/19/2023     Lab Results   Component Value Date    LIPASE 18.2 10/27/2022       Patient Active Problem List   Diagnosis    Nephrolithiasis    Hydronephrosis    Kidney stone on left side    Ureteral stricture, left    Kidney stone    Benign prostatic hyperplasia    Renal stones    Right inguinal hernia    Umbilical hernia without obstruction and without gangrene    Gastroesophageal reflux disease without esophagitis    Dependent edema    Dyslipidemia    Other intestinal malabsorption    Elevated lipids    Nocturia     Procedure Note:    Preoperative Diagnosis: Periumbilical stitch granuloma x 3  Postoperative Diagnosis: Same  Operation: Excision periumbilical stitch granuloma x 3  Surgeon:  Dr. Baxter  Anesthesia: Local -Xylocaine  Complications: none  Indication of Procedure: As above  Procedure: Hubert was taken back to the procedure room.  Consent obtained.  Timeout occurred.  All questions answered.  He wished to proceed.  Placed supine on procedure room table.  Area of concern

## 2024-11-11 ENCOUNTER — OFFICE VISIT (OUTPATIENT)
Dept: SURGERY | Age: 69
End: 2024-11-11
Payer: MEDICARE

## 2024-11-11 VITALS
TEMPERATURE: 97.2 F | OXYGEN SATURATION: 97 % | RESPIRATION RATE: 18 BRPM | SYSTOLIC BLOOD PRESSURE: 128 MMHG | DIASTOLIC BLOOD PRESSURE: 78 MMHG | HEART RATE: 70 BPM

## 2024-11-11 DIAGNOSIS — Z48.02 VISIT FOR SUTURE REMOVAL: Primary | ICD-10-CM

## 2024-11-11 DIAGNOSIS — Z51.89 VISIT FOR WOUND CHECK: ICD-10-CM

## 2024-11-11 PROCEDURE — 1036F TOBACCO NON-USER: CPT | Performed by: NURSE PRACTITIONER

## 2024-11-11 PROCEDURE — G8417 CALC BMI ABV UP PARAM F/U: HCPCS | Performed by: NURSE PRACTITIONER

## 2024-11-11 PROCEDURE — 3017F COLORECTAL CA SCREEN DOC REV: CPT | Performed by: NURSE PRACTITIONER

## 2024-11-11 PROCEDURE — 1126F AMNT PAIN NOTED NONE PRSNT: CPT | Performed by: NURSE PRACTITIONER

## 2024-11-11 PROCEDURE — G8427 DOCREV CUR MEDS BY ELIG CLIN: HCPCS | Performed by: NURSE PRACTITIONER

## 2024-11-11 PROCEDURE — 99212 OFFICE O/P EST SF 10 MIN: CPT | Performed by: NURSE PRACTITIONER

## 2024-11-11 PROCEDURE — 1123F ACP DISCUSS/DSCN MKR DOCD: CPT | Performed by: NURSE PRACTITIONER

## 2024-11-11 PROCEDURE — 1159F MED LIST DOCD IN RCRD: CPT | Performed by: NURSE PRACTITIONER

## 2024-11-11 PROCEDURE — G8484 FLU IMMUNIZE NO ADMIN: HCPCS | Performed by: NURSE PRACTITIONER

## 2024-11-11 NOTE — PROGRESS NOTES
[facility] [place] [facility]  [facility] GENERAL SURGERY  [address]  [facility] [address]  Dept: [phone]  Dept Fax: [phone]  Loc: [phone]    Visit Date: 11/11/2024    Hubert Uribe is a 69 y.o. male who presents today for:  Chief Complaint   Patient presents with    Follow Up After Procedure     S/p Excision periumbilical stitch granuloma x 3 10/30/24-Suture removal       HPI:     WILFRED Gaspar is a 69-year-old male patient who presents for suture removal.  He is status post excision of periumbilical stitch granuloma x 3 almost 2 weeks ago with Dr. Baxter.  All 3 areas are intact and healing without any signs of breakdown or signs of infection.  He does state a few days ago he felt like he pulled on his stitch enough to make it bleed.  After about 24 hours the drainage did slow down but admits to occasional clear fluid drainage.  He has been keeping areas covered with Band-Aid majority of the day.  Eating and drinking well.  No fevers or chills.  Up moving around and tolerating activity well.    Past Medical History:   Diagnosis Date    GERD (gastroesophageal reflux disease)     Hyperlipidemia     BRENDA on CPAP     Other hyperlipidemia       Past Surgical History:   Procedure Laterality Date    APPENDECTOMY  1983??    COLONOSCOPY  03/2016    COLONOSCOPY  06/03/2023    Dr. Ferris    CYSTOSCOPY Left 07/12/2015    Left Ureteroscopy, Pyelogram, Left Ureteral Dilation, Stent Insertion - Dr. Friend    HERNIA REPAIR  10/04/2016    Robotic Right Inguinal Hernia Repair, Umbilical Hernia Repair - Dr. Baxter    LITHOTRIPSY Left 03/18/2016    LT ESWL    TONSILLECTOMY      as a child       Family History   Problem Relation Age of Onset    Sleep Apnea Father        Social History     Tobacco Use    Smoking status: Never    Smokeless tobacco: Never   Substance Use Topics    Alcohol use: Yes     Comment: I might have a beer or two a month      Current Outpatient Medications

## 2024-11-12 ASSESSMENT — ENCOUNTER SYMPTOMS
COLOR CHANGE: 0
ABDOMINAL DISTENTION: 0
VOMITING: 0
CHOKING: 0
SINUS PRESSURE: 0
EYE PAIN: 0
ABDOMINAL PAIN: 0
WHEEZING: 0
CHEST TIGHTNESS: 0
ANAL BLEEDING: 0
BACK PAIN: 0
SORE THROAT: 0
RHINORRHEA: 0
BLOOD IN STOOL: 0
SHORTNESS OF BREATH: 0
PHOTOPHOBIA: 0
EYE ITCHING: 0
NAUSEA: 0
EYE DISCHARGE: 0
DIARRHEA: 0
COUGH: 0
ALLERGIC/IMMUNOLOGIC NEGATIVE: 1
APNEA: 0
CONSTIPATION: 0

## 2025-05-21 ENCOUNTER — OFFICE VISIT (OUTPATIENT)
Dept: SURGERY | Age: 70
End: 2025-05-21
Payer: MEDICARE

## 2025-05-21 VITALS
BODY MASS INDEX: 31.92 KG/M2 | HEART RATE: 80 BPM | WEIGHT: 223 LBS | HEIGHT: 70 IN | RESPIRATION RATE: 18 BRPM | OXYGEN SATURATION: 97 % | TEMPERATURE: 98 F | DIASTOLIC BLOOD PRESSURE: 72 MMHG | SYSTOLIC BLOOD PRESSURE: 116 MMHG

## 2025-05-21 DIAGNOSIS — T81.89XA SUTURE GRANULOMA, INITIAL ENCOUNTER: Primary | ICD-10-CM

## 2025-05-21 PROCEDURE — G8427 DOCREV CUR MEDS BY ELIG CLIN: HCPCS | Performed by: SURGERY

## 2025-05-21 PROCEDURE — G8417 CALC BMI ABV UP PARAM F/U: HCPCS | Performed by: SURGERY

## 2025-05-21 PROCEDURE — 99213 OFFICE O/P EST LOW 20 MIN: CPT | Performed by: SURGERY

## 2025-05-21 PROCEDURE — 1125F AMNT PAIN NOTED PAIN PRSNT: CPT | Performed by: SURGERY

## 2025-05-21 PROCEDURE — 1123F ACP DISCUSS/DSCN MKR DOCD: CPT | Performed by: SURGERY

## 2025-05-21 PROCEDURE — 3017F COLORECTAL CA SCREEN DOC REV: CPT | Performed by: SURGERY

## 2025-05-21 PROCEDURE — 1036F TOBACCO NON-USER: CPT | Performed by: SURGERY

## 2025-05-23 ASSESSMENT — ENCOUNTER SYMPTOMS
SINUS PRESSURE: 0
ANAL BLEEDING: 0
BACK PAIN: 0
EYE REDNESS: 0
VOICE CHANGE: 0
ALLERGIC/IMMUNOLOGIC NEGATIVE: 1
EYE DISCHARGE: 0
ABDOMINAL PAIN: 0
ABDOMINAL DISTENTION: 0
CONSTIPATION: 0
EYE PAIN: 0
CHOKING: 0
COUGH: 0
BLOOD IN STOOL: 0
SHORTNESS OF BREATH: 0
SORE THROAT: 0
COLOR CHANGE: 0
RHINORRHEA: 0
NAUSEA: 0
APNEA: 0
CHEST TIGHTNESS: 0
VOMITING: 0
TROUBLE SWALLOWING: 0
STRIDOR: 0
WHEEZING: 0
RECTAL PAIN: 0
FACIAL SWELLING: 0
EYE ITCHING: 0
DIARRHEA: 0
PHOTOPHOBIA: 0

## 2025-05-23 NOTE — PROGRESS NOTES
Hubert Uribe (:  1955)     ASSESSMENT:  1.  Periumbilical stitch granuloma  2.  History periumbilical stitch granuloma excision (2023) and x 3 (2024)  3.  History robotic right inguinal hernia repair with mesh and umbilical hernia repair with mesh (2016)    PLAN:  1. Schedule Hubert for excision abdominal wall stitch granuloma.  2. The risks, benefits and alternatives were discussed with Hubert including non-operative management.  All questions answered. He understands and wishes to proceed with surgical intervention.  3. Restrictions discussed with Hubert and he expresses understanding.  4. He is advised to call back directly if there are further questions/concerns, or if his symptoms worsen prior to surgery.     --Discussed with patient that if things worsen with drainage and protrusion of suture/mesh then possible surgical excisional debridement may be needed.  At this time proceed only with a stitch granuloma to see how things improve    SUBJECTIVE/OBJECTIVE:    Chief Complaint   Patient presents with    Surgical Consult     Est pt last seen  - Bulge and drainage coming from below belly button when rubs up against things     HPI  Hubert is a 70-year-old male who presents for evaluation of a foreign body/stitch granuloma at the umbilical region. History of a robotic right inguinal hernia repair with mesh and incarcerated umbilical hernia repair with mesh in 2016. He then had a stitch granuloma requiring removal in  and then another 3 stitch granulomas in .  Recently developed discomfort and intermittent drainage again at the periumbilical region.  This time it is inferior.  1 area of discoloration and mild tenderness.  Denies having any penetration of foreign body.  Worse when he is rubbing up against something.  Rest of the incision/scar looks okay.  No fever, chills or sweats.  No generalized abdominal pain.  No new bulge or recurrence of the hernia.  No active

## 2025-06-11 ENCOUNTER — PROCEDURE VISIT (OUTPATIENT)
Dept: SURGERY | Age: 70
End: 2025-06-11

## 2025-06-11 VITALS
TEMPERATURE: 97.4 F | DIASTOLIC BLOOD PRESSURE: 72 MMHG | OXYGEN SATURATION: 97 % | HEART RATE: 66 BPM | SYSTOLIC BLOOD PRESSURE: 124 MMHG | RESPIRATION RATE: 18 BRPM

## 2025-06-11 DIAGNOSIS — T81.89XA SUTURE GRANULOMA, INITIAL ENCOUNTER: Primary | ICD-10-CM

## 2025-06-12 ASSESSMENT — ENCOUNTER SYMPTOMS
VOICE CHANGE: 0
ANAL BLEEDING: 0
FACIAL SWELLING: 0
TROUBLE SWALLOWING: 0
DIARRHEA: 0
RECTAL PAIN: 0
CHEST TIGHTNESS: 0
PHOTOPHOBIA: 0
EYE ITCHING: 0
SINUS PRESSURE: 0
CHOKING: 0
COUGH: 0
APNEA: 0
ABDOMINAL DISTENTION: 0
CONSTIPATION: 0
ABDOMINAL PAIN: 0
BACK PAIN: 0
EYE PAIN: 0
WHEEZING: 0
STRIDOR: 0
SORE THROAT: 0
ALLERGIC/IMMUNOLOGIC NEGATIVE: 1
EYE REDNESS: 0
EYE DISCHARGE: 0
VOMITING: 0
BLOOD IN STOOL: 0
RHINORRHEA: 0
SHORTNESS OF BREATH: 0
NAUSEA: 0
COLOR CHANGE: 0

## 2025-06-12 NOTE — PROGRESS NOTES
granuloma with washout and closure  Surgeon:  Dr. Baxter  Anesthesia: Local -Xylocaine with epinephrine  Complications: none  Indication of Procedure: As above  Procedure: Hubert is a 70-year-old male who presents for stitch granuloma excision at the periumbilical region.  He wishes to proceed.  Consent obtained.  Timeout occurred.  All questions answered.  Placed supine on procedure room table.  Area of concern was prepped and draped in usual sterile standard fashion.  Tolerated local anesthetic well.  Small incision directly around the area of concern at the infraumbilical region with 15 blade scalpel.  Deepened through the deep dermal and into the subcutaneous tissue plane.  Stitch granuloma identified and excised in its entirety and discarded.  Irrigation.  Hemostasis adequate.  No other abnormalities identified in the wound.  Skin reapproximated with 3 separate interrupted 2 oh vertical mattress Prolene sutures.  Dry dressing placed.  Less than 5 cc blood loss.  Tolerated well.  No apparent complications.  Left procedure room in stable condition.      An electronic signature was used to authenticate this note.    --Souleymane Baxter MD

## 2025-06-24 NOTE — PROGRESS NOTES
[facility] [place] [facility]  [facility] GENERAL SURGERY  [address]  [facility] [address]  Dept: [phone]  Dept Fax: [phone]  Loc: [phone]    Visit Date: 6/25/2025    Hubert Uribe is a 70 y.o. male who presents today for:  Chief Complaint   Patient presents with    Follow Up After Procedure     Status post infraumbilical stitch granuloma excision 6/11/25       HPI:     HPI    Hubert is a 70-year-old male patient presents for suture removal.  He is status post infraumbilical stitch granuloma excision 2 weeks ago with Dr. Baxter in the procedure room.  Patient states he was doing really well without any issues until Friday.  Friday he mowed the grass and had to change the oil in his lawnmower.  Did a lot of bending and was on the mower for 3 hours.  Since then he has had some serosanguineous drainage from around his sutures.  Has had more right sided intermittent sharp pain around surgical site.  Has not taken any narcotics.  He is still moving and tolerating activity well.  No issues with eating.  No nausea or vomiting.  No fevers or chills.  All 3 sutures are still intact without any signs of infection.  He does have some bruising periumbilical.  Tenderness around the site as expected but no other concerning signs.    Past Medical History:   Diagnosis Date    GERD (gastroesophageal reflux disease)     Hyperlipidemia     BRENDA on CPAP     Other hyperlipidemia       Past Surgical History:   Procedure Laterality Date    APPENDECTOMY  1983??    COLONOSCOPY  03/2016    COLONOSCOPY  06/03/2023    Dr. Ferris    CYSTOSCOPY Left 07/12/2015    Left Ureteroscopy, Pyelogram, Left Ureteral Dilation, Stent Insertion - Dr. Friend    HERNIA REPAIR  10/04/2016    Robotic Right Inguinal Hernia Repair, Umbilical Hernia Repair - Dr. Baxter    LITHOTRIPSY Left 03/18/2016    LT ESWL    TONSILLECTOMY      as a child       Family History   Problem Relation Age of Onset    Sleep

## 2025-06-25 ENCOUNTER — OFFICE VISIT (OUTPATIENT)
Dept: SURGERY | Age: 70
End: 2025-06-25
Payer: MEDICARE

## 2025-06-25 VITALS
BODY MASS INDEX: 31.28 KG/M2 | OXYGEN SATURATION: 97 % | SYSTOLIC BLOOD PRESSURE: 116 MMHG | HEART RATE: 63 BPM | DIASTOLIC BLOOD PRESSURE: 74 MMHG | HEIGHT: 70 IN | TEMPERATURE: 97.2 F | WEIGHT: 218.5 LBS

## 2025-06-25 DIAGNOSIS — Z98.890 S/P SKIN AND SUBCUTANEOUS TISSUE SURGERY: ICD-10-CM

## 2025-06-25 DIAGNOSIS — Z48.02 VISIT FOR SUTURE REMOVAL: Primary | ICD-10-CM

## 2025-06-25 PROCEDURE — 1036F TOBACCO NON-USER: CPT | Performed by: NURSE PRACTITIONER

## 2025-06-25 PROCEDURE — 1123F ACP DISCUSS/DSCN MKR DOCD: CPT | Performed by: NURSE PRACTITIONER

## 2025-06-25 PROCEDURE — G8427 DOCREV CUR MEDS BY ELIG CLIN: HCPCS | Performed by: NURSE PRACTITIONER

## 2025-06-25 PROCEDURE — 3017F COLORECTAL CA SCREEN DOC REV: CPT | Performed by: NURSE PRACTITIONER

## 2025-06-25 PROCEDURE — 1159F MED LIST DOCD IN RCRD: CPT | Performed by: NURSE PRACTITIONER

## 2025-06-25 PROCEDURE — G8417 CALC BMI ABV UP PARAM F/U: HCPCS | Performed by: NURSE PRACTITIONER

## 2025-06-25 PROCEDURE — 99212 OFFICE O/P EST SF 10 MIN: CPT | Performed by: NURSE PRACTITIONER

## 2025-06-25 ASSESSMENT — ENCOUNTER SYMPTOMS: ABDOMINAL PAIN: 1

## 2025-07-11 ENCOUNTER — OFFICE VISIT (OUTPATIENT)
Dept: FAMILY MEDICINE CLINIC | Age: 70
End: 2025-07-11
Payer: MEDICARE

## 2025-07-11 VITALS
HEIGHT: 70 IN | BODY MASS INDEX: 30.61 KG/M2 | OXYGEN SATURATION: 97 % | RESPIRATION RATE: 14 BRPM | SYSTOLIC BLOOD PRESSURE: 102 MMHG | HEART RATE: 67 BPM | WEIGHT: 213.8 LBS | DIASTOLIC BLOOD PRESSURE: 68 MMHG | TEMPERATURE: 97.8 F

## 2025-07-11 DIAGNOSIS — H60.502 ACUTE OTITIS EXTERNA OF LEFT EAR, UNSPECIFIED TYPE: Primary | ICD-10-CM

## 2025-07-11 PROCEDURE — 1159F MED LIST DOCD IN RCRD: CPT | Performed by: NURSE PRACTITIONER

## 2025-07-11 PROCEDURE — 4130F TOPICAL PREP RX AOE: CPT | Performed by: NURSE PRACTITIONER

## 2025-07-11 PROCEDURE — 1036F TOBACCO NON-USER: CPT | Performed by: NURSE PRACTITIONER

## 2025-07-11 PROCEDURE — G8417 CALC BMI ABV UP PARAM F/U: HCPCS | Performed by: NURSE PRACTITIONER

## 2025-07-11 PROCEDURE — 1123F ACP DISCUSS/DSCN MKR DOCD: CPT | Performed by: NURSE PRACTITIONER

## 2025-07-11 PROCEDURE — G8427 DOCREV CUR MEDS BY ELIG CLIN: HCPCS | Performed by: NURSE PRACTITIONER

## 2025-07-11 PROCEDURE — 3017F COLORECTAL CA SCREEN DOC REV: CPT | Performed by: NURSE PRACTITIONER

## 2025-07-11 PROCEDURE — 99213 OFFICE O/P EST LOW 20 MIN: CPT | Performed by: NURSE PRACTITIONER

## 2025-07-11 RX ORDER — CIPROFLOXACIN AND DEXAMETHASONE 3; 1 MG/ML; MG/ML
4 SUSPENSION/ DROPS AURICULAR (OTIC) 2 TIMES DAILY
Qty: 1 EACH | Refills: 0 | Status: SHIPPED | OUTPATIENT
Start: 2025-07-11 | End: 2025-07-18

## 2025-07-11 ASSESSMENT — PATIENT HEALTH QUESTIONNAIRE - PHQ9
1. LITTLE INTEREST OR PLEASURE IN DOING THINGS: NOT AT ALL
SUM OF ALL RESPONSES TO PHQ QUESTIONS 1-9: 0
2. FEELING DOWN, DEPRESSED OR HOPELESS: NOT AT ALL
SUM OF ALL RESPONSES TO PHQ QUESTIONS 1-9: 0

## 2025-07-11 NOTE — PROGRESS NOTES
Hubert Uribe is a 70 y.o. male thatpresents for Ear Pain      History obtained today from Patient.    History of Present Illness  The patient presents for left ear pain.    Discomfort in the left ear began on Saturday, which he attempted to alleviate by using Debrox and flushing the ear. This resulted in the removal of significant waxy debris and temporary relief. However, by Sunday, the pain had returned and intensified by Monday. He reports no fever but describes the pain as severe and notes a decrease in hearing in the affected ear. He suspects there may still be an obstruction in the ear as he can hear fluid when he tilts his head. He reports no cough, postnasal drainage, or sore throat. He has not taken any over-the-counter pain relievers such as Tylenol or Motrin. He also reports no dizziness. He has not experienced any ear drainage. He has been using Q-tips for ear cleaning. He recalls having his ears flushed several years ago. He was previously diagnosed with tinnitus in his late 20s.        I have reviewed the patient's past medical history, past surgical history, allergies,medications, social and family history and I have made updates where appropriate.    Past Medical History:   Diagnosis Date    GERD (gastroesophageal reflux disease)     Hyperlipidemia     BRENDA on CPAP     Other hyperlipidemia        Social History     Tobacco Use    Smoking status: Never    Smokeless tobacco: Never   Substance Use Topics    Alcohol use: Yes     Comment: I might have a beer or two a month    Drug use: No       Family History   Problem Relation Age of Onset    Sleep Apnea Father          Review of Systems      Review of Systems    Constitutional: Negative for Fever, Chills, Fatigue  Cardiovascular:  Negative for Chest Pain, Palpitations  Respiratory:  Negative for Cough, Wheezing, Shortness of breath  Gastrointestinal:  Nausea, Vomiting, Diarrhea, Constipation, Blood in stool  Genitourinary:  Negative for Difficulty or

## 2025-07-13 ENCOUNTER — RESULTS FOLLOW-UP (OUTPATIENT)
Dept: FAMILY MEDICINE CLINIC | Age: 70
End: 2025-07-13

## 2025-07-13 LAB
BACTERIA SPEC AEROBE CULT: ABNORMAL
GRAM STN SPEC: ABNORMAL
ORGANISM: ABNORMAL

## 2025-07-13 RX ORDER — CLOTRIMAZOLE 1 G/ML
SOLUTION TOPICAL
Qty: 1 EACH | Refills: 0 | Status: SHIPPED | OUTPATIENT
Start: 2025-07-13

## 2025-07-14 ENCOUNTER — TELEPHONE (OUTPATIENT)
Dept: FAMILY MEDICINE CLINIC | Age: 70
End: 2025-07-14

## 2025-07-14 DIAGNOSIS — B36.9 OTOMYCOSIS: Primary | ICD-10-CM

## 2025-07-14 DIAGNOSIS — H62.40 OTOMYCOSIS: Primary | ICD-10-CM

## 2025-07-14 LAB
BACTERIA SPEC AEROBE CULT: ABNORMAL
GRAM STN SPEC: ABNORMAL
ORGANISM: ABNORMAL

## 2025-07-14 NOTE — TELEPHONE ENCOUNTER
Pt informed of culture results . Pt voiced understanding with no further questions at this time.       Ok with referral to ENT

## 2025-07-14 NOTE — TELEPHONE ENCOUNTER
Joan Suarez, APRN - CNP to [facility] Clinical Staff       7/13/25  3:04 PM  Result Note  Let pt know his ear culture is so far growing mold. I dont have final results yet but I would like to start lotrimin drops for 10 days and see ENT. If agreeable I will place referral. I have sent drops to the pharmacy. Continue the other drops I gave him as well, for now.

## 2025-07-15 SDOH — ECONOMIC STABILITY: FOOD INSECURITY: WITHIN THE PAST 12 MONTHS, YOU WORRIED THAT YOUR FOOD WOULD RUN OUT BEFORE YOU GOT MONEY TO BUY MORE.: NEVER TRUE

## 2025-07-15 SDOH — ECONOMIC STABILITY: INCOME INSECURITY: IN THE LAST 12 MONTHS, WAS THERE A TIME WHEN YOU WERE NOT ABLE TO PAY THE MORTGAGE OR RENT ON TIME?: NO

## 2025-07-15 SDOH — ECONOMIC STABILITY: FOOD INSECURITY: WITHIN THE PAST 12 MONTHS, THE FOOD YOU BOUGHT JUST DIDN'T LAST AND YOU DIDN'T HAVE MONEY TO GET MORE.: NEVER TRUE

## 2025-07-15 SDOH — ECONOMIC STABILITY: TRANSPORTATION INSECURITY
IN THE PAST 12 MONTHS, HAS THE LACK OF TRANSPORTATION KEPT YOU FROM MEDICAL APPOINTMENTS OR FROM GETTING MEDICATIONS?: NO

## 2025-07-18 ENCOUNTER — OFFICE VISIT (OUTPATIENT)
Dept: FAMILY MEDICINE CLINIC | Age: 70
End: 2025-07-18

## 2025-07-18 VITALS
SYSTOLIC BLOOD PRESSURE: 124 MMHG | BODY MASS INDEX: 29.98 KG/M2 | OXYGEN SATURATION: 96 % | HEART RATE: 60 BPM | WEIGHT: 209.4 LBS | HEIGHT: 70 IN | TEMPERATURE: 97.8 F | RESPIRATION RATE: 14 BRPM | DIASTOLIC BLOOD PRESSURE: 68 MMHG

## 2025-07-18 DIAGNOSIS — B36.9 OTOMYCOSIS: Primary | ICD-10-CM

## 2025-07-18 DIAGNOSIS — H62.40 OTOMYCOSIS: Primary | ICD-10-CM

## 2025-07-18 NOTE — PROGRESS NOTES
Hubert Uribe is a 70 y.o. male thatpresents for Follow-up      History obtained today from Patient.    Ear follow up  1 week    Left ear is improving   Thought right ear was having pain this morning but its improved now  Using both antibiotic drops and lotrimin drops with improvement        Last appointment  The patient presents for left ear pain.     Discomfort in the left ear began on Saturday, which he attempted to alleviate by using Debrox and flushing the ear. This resulted in the removal of significant waxy debris and temporary relief. However, by Sunday, the pain had returned and intensified by Monday. He reports no fever but describes the pain as severe and notes a decrease in hearing in the affected ear. He suspects there may still be an obstruction in the ear as he can hear fluid when he tilts his head. He reports no cough, postnasal drainage, or sore throat. He has not taken any over-the-counter pain relievers such as Tylenol or Motrin. He also reports no dizziness. He has not experienced any ear drainage. He has been using Q-tips for ear cleaning. He recalls having his ears flushed several years ago. He was previously diagnosed with tinnitus in his late 20s.             I have reviewed the patient's past medical history, past surgical history, allergies,medications, social and family history and I have made updates where appropriate.    Past Medical History:   Diagnosis Date    GERD (gastroesophageal reflux disease)     Hyperlipidemia     BRENDA on CPAP     Other hyperlipidemia        Social History     Tobacco Use    Smoking status: Never    Smokeless tobacco: Never   Substance Use Topics    Alcohol use: Yes     Comment: I might have a beer or two a month    Drug use: No       Family History   Problem Relation Age of Onset    Sleep Apnea Father          Review of Systems    Review of Systems    Constitutional: Negative for Fever, Chills, Fatigue  Cardiovascular:  Negative forChest Pain,